# Patient Record
Sex: FEMALE | Race: WHITE | NOT HISPANIC OR LATINO | Employment: FULL TIME | URBAN - METROPOLITAN AREA
[De-identification: names, ages, dates, MRNs, and addresses within clinical notes are randomized per-mention and may not be internally consistent; named-entity substitution may affect disease eponyms.]

---

## 2023-11-03 PROBLEM — O99.019 ANEMIA OF PREGNANCY (HHS-HCC): Status: ACTIVE | Noted: 2023-11-03

## 2023-11-03 RX ORDER — IBUPROFEN 200 MG
600 TABLET ORAL EVERY 6 HOURS PRN
COMMUNITY
End: 2023-11-06

## 2023-11-03 RX ORDER — NAPROXEN 500 MG/1
500 TABLET ORAL
COMMUNITY
Start: 2015-04-14 | End: 2023-11-06

## 2023-11-06 ENCOUNTER — INITIAL PRENATAL (OUTPATIENT)
Dept: OBSTETRICS AND GYNECOLOGY | Facility: CLINIC | Age: 28
End: 2023-11-06
Payer: COMMERCIAL

## 2023-11-06 VITALS — WEIGHT: 123 LBS | SYSTOLIC BLOOD PRESSURE: 110 MMHG | BODY MASS INDEX: 23.24 KG/M2 | DIASTOLIC BLOOD PRESSURE: 60 MMHG

## 2023-11-06 DIAGNOSIS — Z32.01 POSITIVE URINE PREGNANCY TEST (HHS-HCC): Primary | ICD-10-CM

## 2023-11-06 LAB — PREGNANCY TEST URINE, POC: POSITIVE

## 2023-11-06 PROCEDURE — 87086 URINE CULTURE/COLONY COUNT: CPT

## 2023-11-06 PROCEDURE — 87800 DETECT AGNT MULT DNA DIREC: CPT

## 2023-11-06 PROCEDURE — 99215 OFFICE O/P EST HI 40 MIN: CPT | Performed by: OBSTETRICS & GYNECOLOGY

## 2023-11-06 PROCEDURE — 81025 URINE PREGNANCY TEST: CPT | Performed by: OBSTETRICS & GYNECOLOGY

## 2023-11-06 PROCEDURE — 88175 CYTOPATH C/V AUTO FLUID REDO: CPT

## 2023-11-06 PROCEDURE — 87186 SC STD MICRODIL/AGAR DIL: CPT

## 2023-11-06 ASSESSMENT — EDINBURGH POSTNATAL DEPRESSION SCALE (EPDS)
I HAVE BEEN SO UNHAPPY THAT I HAVE BEEN CRYING: NO, NEVER
I HAVE BEEN SO UNHAPPY THAT I HAVE HAD DIFFICULTY SLEEPING: NOT VERY OFTEN
I HAVE BLAMED MYSELF UNNECESSARILY WHEN THINGS WENT WRONG: NOT VERY OFTEN
I HAVE FELT SCARED OR PANICKY FOR NO GOOD REASON: NO, NOT MUCH
I HAVE LOOKED FORWARD WITH ENJOYMENT TO THINGS: AS MUCH AS I EVER DID
I HAVE BEEN ABLE TO LAUGH AND SEE THE FUNNY SIDE OF THINGS: AS MUCH AS I ALWAYS COULD
I HAVE BEEN ANXIOUS OR WORRIED FOR NO GOOD REASON: YES, SOMETIMES
TOTAL SCORE: 6
THE THOUGHT OF HARMING MYSELF HAS OCCURRED TO ME: NEVER
I HAVE FELT SAD OR MISERABLE: NO, NOT AT ALL
THINGS HAVE BEEN GETTING ON TOP OF ME: NO, MOST OF THE TIME I HAVE COPED QUITE WELL

## 2023-11-06 NOTE — PROGRESS NOTES
25-year-old -0-0-1 woman presents for new OB visit.  She smokes 1 black amount a day and uses marijuana.  She reports that the marijuana helps with her appetite.  She is without any symptoms of SAB or preeclampsia.      GynH: M12/Qmonths.  Denies any STIs, PID, abnormal Pap smears or sexual abuse.  She is sexually active with 1 male partner.    OBHx:  x1.     A/P: HROB - tobacco and MJ use     -  NOB packet given     -  PNV rx     -  PNL     -  1st  Check     -  Tobacco cessation encouraged     -  MJ d/w pt     -  Genetics for desired testing    -  D/W the patient our practice     -  RTC 4 weeks     -  Pap sent

## 2023-11-06 NOTE — PATIENT INSTRUCTIONS
Congratulations on your pregnancy!    Pap smear was sent.  Results should be available in the next few weeks.  However, if you have been unable to review the results by the end of the month please give the office a call.    Routine prenatal labs are sent today.  Results should be available by Friday.  You may call the office and select option #2 to speak with the nurse to obtain the results.    A prescription for a prenatal vitamin has been sent to your pharmacy.  Start taking 1 tablet daily.    We encourage you to stop smoking and using marijuana.  You the information about smoking and marijuana use during pregnancy.    Return to the office every 4 weeks for your next OB appointment.    Feel free to call the office with any problems, questions or concerns prior to your next scheduled visit.

## 2023-11-08 ENCOUNTER — TELEPHONE (OUTPATIENT)
Dept: OBSTETRICS AND GYNECOLOGY | Facility: CLINIC | Age: 28
End: 2023-11-08

## 2023-11-08 ENCOUNTER — LAB (OUTPATIENT)
Dept: LAB | Facility: LAB | Age: 28
End: 2023-11-08
Payer: COMMERCIAL

## 2023-11-08 DIAGNOSIS — Z32.01 POSITIVE URINE PREGNANCY TEST (HHS-HCC): ICD-10-CM

## 2023-11-08 DIAGNOSIS — O23.41 URINARY TRACT INFECTION IN MOTHER DURING FIRST TRIMESTER OF PREGNANCY (HHS-HCC): Primary | ICD-10-CM

## 2023-11-08 LAB
ABO GROUP (TYPE) IN BLOOD: NORMAL
ANTIBODY SCREEN: NORMAL
BACTERIA UR CULT: ABNORMAL
C TRACH RRNA SPEC QL NAA+PROBE: NEGATIVE
ERYTHROCYTE [DISTWIDTH] IN BLOOD BY AUTOMATED COUNT: 13.4 % (ref 11.5–14.5)
HBV SURFACE AG SERPL QL IA: NONREACTIVE
HCT VFR BLD AUTO: 35 % (ref 36–46)
HCV AB SER QL: NONREACTIVE
HGB BLD-MCNC: 11.4 G/DL (ref 12–16)
HIV 1+2 AB+HIV1 P24 AG SERPL QL IA: NONREACTIVE
MCH RBC QN AUTO: 28.2 PG (ref 26–34)
MCHC RBC AUTO-ENTMCNC: 32.6 G/DL (ref 32–36)
MCV RBC AUTO: 87 FL (ref 80–100)
N GONORRHOEA DNA SPEC QL PROBE+SIG AMP: NEGATIVE
NRBC BLD-RTO: 0 /100 WBCS (ref 0–0)
PLATELET # BLD AUTO: 268 X10*3/UL (ref 150–450)
RBC # BLD AUTO: 4.04 X10*6/UL (ref 4–5.2)
RH FACTOR (ANTIGEN D): NORMAL
RUBV IGG SERPL IA-ACNC: 0.3 IA
RUBV IGG SERPL QL IA: NEGATIVE
T PALLIDUM AB SER QL: NONREACTIVE
T4 FREE SERPL-MCNC: 0.9 NG/DL (ref 0.78–1.48)
TSH SERPL-ACNC: 0.36 MIU/L (ref 0.44–3.98)
WBC # BLD AUTO: 8.6 X10*3/UL (ref 4.4–11.3)

## 2023-11-08 PROCEDURE — 83021 HEMOGLOBIN CHROMOTOGRAPHY: CPT

## 2023-11-08 PROCEDURE — 84439 ASSAY OF FREE THYROXINE: CPT

## 2023-11-08 PROCEDURE — 86803 HEPATITIS C AB TEST: CPT

## 2023-11-08 PROCEDURE — 86317 IMMUNOASSAY INFECTIOUS AGENT: CPT

## 2023-11-08 PROCEDURE — 83020 HEMOGLOBIN ELECTROPHORESIS: CPT

## 2023-11-08 PROCEDURE — 87340 HEPATITIS B SURFACE AG IA: CPT

## 2023-11-08 PROCEDURE — 86900 BLOOD TYPING SEROLOGIC ABO: CPT

## 2023-11-08 PROCEDURE — 36415 COLL VENOUS BLD VENIPUNCTURE: CPT

## 2023-11-08 PROCEDURE — 87389 HIV-1 AG W/HIV-1&-2 AB AG IA: CPT

## 2023-11-08 PROCEDURE — 85027 COMPLETE CBC AUTOMATED: CPT

## 2023-11-08 PROCEDURE — 86901 BLOOD TYPING SEROLOGIC RH(D): CPT

## 2023-11-08 PROCEDURE — 86780 TREPONEMA PALLIDUM: CPT

## 2023-11-08 PROCEDURE — 84443 ASSAY THYROID STIM HORMONE: CPT

## 2023-11-08 PROCEDURE — 86850 RBC ANTIBODY SCREEN: CPT

## 2023-11-08 RX ORDER — CEPHALEXIN 500 MG/1
500 CAPSULE ORAL 4 TIMES DAILY
Qty: 28 CAPSULE | Refills: 0 | Status: SHIPPED | OUTPATIENT
Start: 2023-11-08 | End: 2023-11-15

## 2023-11-08 NOTE — TELEPHONE ENCOUNTER
Nurse left message for pt to return call.  Felisa Leonard MD  [facility] Clinical Support Staff  1st Trimester OB.  No known drug allergies.  Keflex 500 mg 4 times a day for 7 days

## 2023-11-09 LAB
HEMOGLOBIN A2: 3 % (ref 2–3.5)
HEMOGLOBIN A: 96.3 % (ref 95.8–98)
HEMOGLOBIN F: 0.7 % (ref 0–2)
HEMOGLOBIN IDENTIFICATION INTERPRETATION: NORMAL
PATH REVIEW-HGB IDENTIFICATION: NORMAL
REFLEX ADDED, ANEMIA PANEL: NORMAL

## 2023-11-09 NOTE — TELEPHONE ENCOUNTER
Pt verified by name and .  Pt is aware of Dr. Leonard's message:  Felisa Leonard MD  [facility] [place] [id number] [facility] Clinical Support Staff  1st Trimester OB.  No known drug allergies.  Keflex 500 mg 4 times a day for 7 days  Pt has no questions or concerns at this time.

## 2023-11-15 ENCOUNTER — ANCILLARY PROCEDURE (OUTPATIENT)
Dept: RADIOLOGY | Facility: CLINIC | Age: 28
End: 2023-11-15
Payer: COMMERCIAL

## 2023-11-15 DIAGNOSIS — Z34.90 PREGNANT (HHS-HCC): ICD-10-CM

## 2023-11-15 PROCEDURE — 76813 OB US NUCHAL MEAS 1 GEST: CPT | Performed by: OBSTETRICS & GYNECOLOGY

## 2023-11-15 PROCEDURE — 76801 OB US < 14 WKS SINGLE FETUS: CPT

## 2023-11-15 PROCEDURE — 76813 OB US NUCHAL MEAS 1 GEST: CPT

## 2023-11-21 ENCOUNTER — CLINICAL SUPPORT (OUTPATIENT)
Dept: GENETICS | Facility: HOSPITAL | Age: 28
End: 2023-11-21
Payer: COMMERCIAL

## 2023-11-21 ENCOUNTER — LAB (OUTPATIENT)
Dept: LAB | Facility: LAB | Age: 28
End: 2023-11-21
Payer: COMMERCIAL

## 2023-11-21 VITALS — BODY MASS INDEX: 23.81 KG/M2 | DIASTOLIC BLOOD PRESSURE: 60 MMHG | WEIGHT: 126 LBS | SYSTOLIC BLOOD PRESSURE: 98 MMHG

## 2023-11-21 DIAGNOSIS — Z13.79 ENCOUNTER FOR GENETIC SCREENING: ICD-10-CM

## 2023-11-21 DIAGNOSIS — Z32.01 POSITIVE URINE PREGNANCY TEST (HHS-HCC): ICD-10-CM

## 2023-11-21 PROCEDURE — GENMD PR GENETICS VISIT (MEDICAID/MEDICARE): Performed by: GENETIC COUNSELOR, MS

## 2023-11-21 PROCEDURE — 36415 COLL VENOUS BLD VENIPUNCTURE: CPT

## 2023-11-21 ASSESSMENT — PAIN SCALES - GENERAL: PAINLEVEL: 0-NO PAIN

## 2023-11-22 LAB
CYTOLOGY CMNT CVX/VAG CYTO-IMP: NORMAL
LAB AP HPV GENOTYPE QUESTION: YES
LAB AP HPV HR: NORMAL
LAB AP PAP ADDITIONAL TESTS: NORMAL
LABORATORY COMMENT REPORT: NORMAL
LMP START DATE: NORMAL
MENSTRUAL HX REPORTED: NORMAL
PATH REPORT.TOTAL CANCER: NORMAL

## 2023-11-29 NOTE — PROGRESS NOTES
"Cee Morales is a 27 y.o. old, , female who was approximately 12 weeks and 5 days pregnant at the time of our appointment with an EDC of 24.  She was seen to discuss her genetic screening and testing options.    PAST HISTORY:  Ms. Morales reported that she is currently smoking one black and mild a day.  She was informed that smoking increases the risk to have a baby with low birth weight, placental disorders, and  delivery.  Quitting smoking at anytime during the pregnancy has been shown to be beneficial for the fetus.      Ultrasound 11/15/23, per report:  \"- In utero gestational sac with a live fetus  - Crown rump length is consistent with given MARLON  - Fetal organ survey is within normal limits for the gestational age  - Nuchal translucency is within normal limits measuring 1.4 mm  - Normal appearing adnexa...\"    The patient has not had any screening or diagnostic testing for aneuploidy in her pregnancy thus far.    FAMILY HISTORY:  Medical and family histories were reviewed and the following concerns regarding this pregnancy were apparent:      Ms. Alaniz:  Brother- \"hole in heart\"  Paternal grandmother- breast cancer, diagnosed in her early 60's    The remainder of the family history was negative for birth defects, intellectual disability, recurrent pregnancy loss, or recognized inherited conditions.  Consanguinity was denied.  The Pedigree is scanned in the Media tab and is available for a full review of the family history.      ETHNICITY:  The patient reported that she is of  (countries of origin are unknown) ethnicity.  She reported that her partner is of  ethnicity.  Ashkenazi Muslim Ancestry was denied.    We discussed the availability, benefits, and limitations of carrier screening for cystic fibrosis (CF), spinal muscular atrophy (SMA), and hemoglobinopathies/thalassemias. We reviewed the carrier frequencies of these conditions, varied clinical " manifestations, and their autosomal recessive inheritance. The patient has already had negative carrier screening for hemoglobinopathies and thalassemias via CBC and hemoglobin electrophoresis and these results were reviewed. If both members of the couple are found to be carriers for the same autosomal recessive condition, there is a 25% chance for an affected child and prenatal diagnosis would be available. Negative carrier screening does not rule out the possibility of being a carrier.  screening is available for CF, hemoglobinopathies, and thalassemias, and SMA.  We also discussed the availability of more expanded/pan ethnic carrier screening for additional, primarily autosomal recessive, conditions. We discussed the pros and cons of expanded carrier screening including the higher likelihood of being identified as a carrier for at least one condition on a larger panel. Approximately 4% of couples are found to be at risk to have a child with a genetic disorder based on this screening. In rare cases, expanded carrier screening results may have health implications for the tested individual.      After careful consideration, the patient declined all carrier screening.      COUNSELING:  The following information was discussed with your patient:    1. The general population risk of 3-5% for birth defects in every pregnancy.    2. Chromosomes, genes, non-disjunction, and common aneuploidies.    3.  Based on the age of 28 at EDC alone, at this gestation, the risk for the fetus to have Down syndrome is approximately 1 in 770.  The combined risk for the fetus to have Trisomy 21/18/13 is approximately 1 in 515.  This does not include copy number variation, mosaicism, single gene disorders, translocations, and marker chromosomes.     4. The availability of maternal serum screening during the first trimester.    5. The availability, benefits and limitations of ultrasound study. An ultrasound study is recommended at  12-14 weeks gestation for assessment of nuchal translucency and again at 19-20 weeks gestation to survey fetal organs. An ultrasound study in the second trimester can identify 50% of Down syndrome cases and 80-90% of trisomy 18 or trisomy 13 cases.     6. The availability, benefits and limitations of standard cell-free DNA screening.  We discussed the methodology for this screen, which includes using cell-free DNA obtained from a mother's blood (derived from the placenta) to screen for the presence of common chromosomal abnormalities. Depending on the laboratory used, there is a >99% sensitivity for Down syndrome, at least 97.4% sensitivity for trisomy 18, and at least 91% sensitivity for trisomy 13.  Specificity for these trisomies is >99%. Although sensitivity and specificity rates are high, in our experience the positive predictive value is dependent on many factors; whereas false negative results are rare.  In addition, anticoagulants, maternal chromosome abnormality, fibroids or malignancy may impact results and/or be associated with inconclusive or other abnormal findings.  This is not a diagnostic test.  Therefore, in the event of an abnormal result, prenatal diagnosis through amniocentesis is recommended to confirm the findings, if confirmation is desired.  Results take approximately 7-10 days.      7. The methods, benefits, limitations and risks of CVS.  There is an approximate 1 in 200 risk of complications including a 1 in 400 risk for miscarriage. The approximate 1% risk of confined placental mosaicism in CVS was discussed.     8. The methods, benefits, limitations and risks of amniocentesis.  There is an approximate 1 in 400 risk of complications including a 1 in 800 risk of miscarriage.    9. Additional Family History Information:  The patient's brother was born with a congenital heart defect. A congenital heart defect may be an isolated finding or one feature of a chromosomal condition, single gene  disorder, or multiple malformation syndrome. When isolated (occurring alone), congenital heart defects are often considered multifactorial in origin, involving a combination of genetic, environmental and unknown factors. If the defect is isolated, the risk for close relatives is increased.  If isolated, the risk for the couple's offspring to have a congenital heart defect is approximately 1-2% compared to the general population risk of 0.5-1%.  If the defect is due to a chromosomal condition, single gene disorder, or multiple malformation syndrome, the risk to relatives may be significantly higher.     DISPOSITION:  The patient stated that she understood the above information and elected to proceed with standard cell-free DNA screening to [employer] for [employer].  All carrier screening was declined. Diagnostic testing in the form of CVS and genetic amniocentesis was declined.      We recommend an anatomy ultrasound at 19 weeks.    Thank you for allowing us to participate in the care of your patient.  Should you or your patient have any questions, please do not hesitate to contact our office at [phone].    Licensed Genetic Counselor Malathi Beck MS, [facility] spent 25 minutes with the patient with greater than 50% of the time spent in face to face counseling.     Sincerely,     Malathi Beck MS  Licensed Genetic Counselor    Reviewed by:  Dr. Chavez Martinez MD  Maternal Fetal Medicine Specialist

## 2023-12-04 ENCOUNTER — ROUTINE PRENATAL (OUTPATIENT)
Dept: OBSTETRICS AND GYNECOLOGY | Facility: CLINIC | Age: 28
End: 2023-12-04
Payer: COMMERCIAL

## 2023-12-04 VITALS — SYSTOLIC BLOOD PRESSURE: 108 MMHG | BODY MASS INDEX: 24 KG/M2 | DIASTOLIC BLOOD PRESSURE: 60 MMHG | WEIGHT: 127 LBS

## 2023-12-04 DIAGNOSIS — O09.91 HIGH-RISK PREGNANCY IN FIRST TRIMESTER (HHS-HCC): Primary | ICD-10-CM

## 2023-12-04 PROCEDURE — 99213 OFFICE O/P EST LOW 20 MIN: CPT | Performed by: OBSTETRICS & GYNECOLOGY

## 2023-12-04 NOTE — PROGRESS NOTES
She reports feeling well.  She is without any symptoms of SAB or PEC.  She did genetic testing and was excited to know the gender.  She has decreased her smoking.  She is without nausea/vomiting.    A/P: 13+ week IUP - tobacco and MJ use has decreased     -  RTC 4 weeks     -  D/W the patient when to call

## 2023-12-18 ENCOUNTER — TELEPHONE (OUTPATIENT)
Dept: OBSTETRICS AND GYNECOLOGY | Facility: CLINIC | Age: 28
End: 2023-12-18
Payer: COMMERCIAL

## 2023-12-18 DIAGNOSIS — Z36.89 SCREENING, ANTENATAL, FOR FETAL ANATOMIC SURVEY (HHS-HCC): ICD-10-CM

## 2023-12-27 LAB — SCAN RESULT: NORMAL

## 2024-01-03 ENCOUNTER — HOSPITAL ENCOUNTER (OUTPATIENT)
Dept: RADIOLOGY | Facility: HOSPITAL | Age: 29
Discharge: HOME | End: 2024-01-03
Payer: COMMERCIAL

## 2024-01-03 DIAGNOSIS — Z36.89 SCREENING, ANTENATAL, FOR FETAL ANATOMIC SURVEY (HHS-HCC): ICD-10-CM

## 2024-01-03 PROCEDURE — 76805 OB US >/= 14 WKS SNGL FETUS: CPT | Performed by: OBSTETRICS & GYNECOLOGY

## 2024-01-03 PROCEDURE — 76811 OB US DETAILED SNGL FETUS: CPT

## 2024-01-09 ENCOUNTER — ROUTINE PRENATAL (OUTPATIENT)
Dept: OBSTETRICS AND GYNECOLOGY | Facility: CLINIC | Age: 29
End: 2024-01-09
Payer: COMMERCIAL

## 2024-01-09 VITALS — SYSTOLIC BLOOD PRESSURE: 110 MMHG | DIASTOLIC BLOOD PRESSURE: 60 MMHG | WEIGHT: 135 LBS | BODY MASS INDEX: 25.51 KG/M2

## 2024-01-09 DIAGNOSIS — O09.91 HIGH-RISK PREGNANCY IN FIRST TRIMESTER (HHS-HCC): Primary | ICD-10-CM

## 2024-01-09 DIAGNOSIS — O99.330 TOBACCO USE AFFECTING PREGNANCY, ANTEPARTUM (HHS-HCC): ICD-10-CM

## 2024-01-09 PROCEDURE — 99214 OFFICE O/P EST MOD 30 MIN: CPT | Performed by: OBSTETRICS & GYNECOLOGY

## 2024-01-09 NOTE — PROGRESS NOTES
The patient reports good fetal movement.  She is without symptoms of PTL or PEC, although she did have a headache that lasted all day yesterday and finally went away.  She was unsure what she could use in pregnancy.    O: normal anatomy US      A/P: RAFAEL     -  RTC 4 weeks     -  Tobacco cessation encouraged

## 2024-02-06 ENCOUNTER — ROUTINE PRENATAL (OUTPATIENT)
Dept: OBSTETRICS AND GYNECOLOGY | Facility: CLINIC | Age: 29
End: 2024-02-06
Payer: COMMERCIAL

## 2024-02-06 VITALS — DIASTOLIC BLOOD PRESSURE: 50 MMHG | SYSTOLIC BLOOD PRESSURE: 98 MMHG | WEIGHT: 145 LBS | BODY MASS INDEX: 27.4 KG/M2

## 2024-02-06 DIAGNOSIS — Z34.82 PRENATAL CARE, SUBSEQUENT PREGNANCY IN SECOND TRIMESTER (HHS-HCC): Primary | ICD-10-CM

## 2024-02-06 DIAGNOSIS — O99.330 TOBACCO USE AFFECTING PREGNANCY, ANTEPARTUM (HHS-HCC): ICD-10-CM

## 2024-02-06 PROCEDURE — 99214 OFFICE O/P EST MOD 30 MIN: CPT | Performed by: OBSTETRICS & GYNECOLOGY

## 2024-02-06 NOTE — PROGRESS NOTES
Patient reports lots of fetal movement.  She is without symptoms of PTL or PEC.    She is overall feeling well.  She is still smoking.    A/P: RAFAEL - smoking    -  RTC 4 weeks     -  Second trimester labs and depression screen at next visit

## 2024-03-05 ENCOUNTER — ROUTINE PRENATAL (OUTPATIENT)
Dept: OBSTETRICS AND GYNECOLOGY | Facility: CLINIC | Age: 29
End: 2024-03-05
Payer: COMMERCIAL

## 2024-03-05 VITALS — DIASTOLIC BLOOD PRESSURE: 60 MMHG | BODY MASS INDEX: 28.34 KG/M2 | SYSTOLIC BLOOD PRESSURE: 112 MMHG | WEIGHT: 150 LBS

## 2024-03-05 DIAGNOSIS — Z34.82 PRENATAL CARE, SUBSEQUENT PREGNANCY IN SECOND TRIMESTER (HHS-HCC): ICD-10-CM

## 2024-03-05 DIAGNOSIS — O99.330 TOBACCO USE AFFECTING PREGNANCY, ANTEPARTUM (HHS-HCC): Primary | ICD-10-CM

## 2024-03-05 PROCEDURE — 82746 ASSAY OF FOLIC ACID SERUM: CPT

## 2024-03-05 PROCEDURE — 83550 IRON BINDING TEST: CPT

## 2024-03-05 PROCEDURE — 85027 COMPLETE CBC AUTOMATED: CPT

## 2024-03-05 PROCEDURE — 99214 OFFICE O/P EST MOD 30 MIN: CPT | Performed by: OBSTETRICS & GYNECOLOGY

## 2024-03-05 PROCEDURE — 82728 ASSAY OF FERRITIN: CPT

## 2024-03-05 PROCEDURE — 82947 ASSAY GLUCOSE BLOOD QUANT: CPT

## 2024-03-05 PROCEDURE — 36415 COLL VENOUS BLD VENIPUNCTURE: CPT

## 2024-03-05 PROCEDURE — 86780 TREPONEMA PALLIDUM: CPT

## 2024-03-05 PROCEDURE — 82607 VITAMIN B-12: CPT

## 2024-03-05 ASSESSMENT — EDINBURGH POSTNATAL DEPRESSION SCALE (EPDS)
I HAVE BLAMED MYSELF UNNECESSARILY WHEN THINGS WENT WRONG: NO, NEVER
I HAVE FELT SCARED OR PANICKY FOR NO GOOD REASON: NO, NOT AT ALL
I HAVE LOOKED FORWARD WITH ENJOYMENT TO THINGS: AS MUCH AS I EVER DID
I HAVE BEEN SO UNHAPPY THAT I HAVE HAD DIFFICULTY SLEEPING: NOT AT ALL
I HAVE BEEN SO UNHAPPY THAT I HAVE BEEN CRYING: NO, NEVER
THINGS HAVE BEEN GETTING ON TOP OF ME: NO, I HAVE BEEN COPING AS WELL AS EVER
I HAVE BEEN ANXIOUS OR WORRIED FOR NO GOOD REASON: NO, NOT AT ALL
THE THOUGHT OF HARMING MYSELF HAS OCCURRED TO ME: NEVER
TOTAL SCORE: 0
I HAVE FELT SAD OR MISERABLE: NO, NOT AT ALL
I HAVE BEEN ABLE TO LAUGH AND SEE THE FUNNY SIDE OF THINGS: AS MUCH AS I ALWAYS COULD

## 2024-03-05 NOTE — PROGRESS NOTES
Patient reports good fetal movement.  She is without symptoms of PTL or PEC.  She is starting to get ready for delivery.  She is unsure what she would like to use for contraception.  She has a pediatrician selected. She does not plan to breastfeed.     O: S+D     A/P: RAFAEL     -  Second trimester labs today     -  Pt to continues to decrease tobacco     -  Fetal movement     -  Tour info for [facility] at next visit

## 2024-03-06 LAB
ERYTHROCYTE [DISTWIDTH] IN BLOOD BY AUTOMATED COUNT: 13.3 % (ref 11.5–14.5)
FERRITIN SERPL-MCNC: 38 NG/ML
GLUCOSE 1H P 50 G GLC PO SERPL-MCNC: 83 MG/DL
HCT VFR BLD AUTO: 32.1 % (ref 36–46)
HGB BLD-MCNC: 10 G/DL (ref 12–16)
IRON SATN MFR SERPL: 17 %
IRON SERPL-MCNC: 79 UG/DL
MCH RBC QN AUTO: 28.2 PG (ref 26–34)
MCHC RBC AUTO-ENTMCNC: 31.2 G/DL (ref 32–36)
MCV RBC AUTO: 91 FL (ref 80–100)
NRBC BLD-RTO: 0 /100 WBCS (ref 0–0)
PLATELET # BLD AUTO: 247 X10*3/UL (ref 150–450)
RBC # BLD AUTO: 3.54 X10*6/UL (ref 4–5.2)
REFLEX ADDED, ANEMIA PANEL: NORMAL
TIBC SERPL-MCNC: 462 UG/DL
TREPONEMA PALLIDUM IGG+IGM AB [PRESENCE] IN SERUM OR PLASMA BY IMMUNOASSAY: NONREACTIVE
UIBC SERPL-MCNC: 383 UG/DL
VIT B12 SERPL-MCNC: 191 PG/ML
WBC # BLD AUTO: 10.1 X10*3/UL (ref 4.4–11.3)

## 2024-03-07 LAB — FOLATE SERPL-MCNC: 18.3 NG/ML

## 2024-03-21 ENCOUNTER — ROUTINE PRENATAL (OUTPATIENT)
Dept: OBSTETRICS AND GYNECOLOGY | Facility: CLINIC | Age: 29
End: 2024-03-21
Payer: COMMERCIAL

## 2024-03-21 VITALS — WEIGHT: 152 LBS | SYSTOLIC BLOOD PRESSURE: 92 MMHG | BODY MASS INDEX: 28.72 KG/M2 | DIASTOLIC BLOOD PRESSURE: 64 MMHG

## 2024-03-21 DIAGNOSIS — O99.013 ANEMIA DURING PREGNANCY IN THIRD TRIMESTER (HHS-HCC): Primary | ICD-10-CM

## 2024-03-21 PROCEDURE — 99214 OFFICE O/P EST MOD 30 MIN: CPT | Performed by: ADVANCED PRACTICE MIDWIFE

## 2024-03-21 RX ORDER — FAMOTIDINE 10 MG/ML
20 INJECTION INTRAVENOUS ONCE AS NEEDED
Status: CANCELLED | OUTPATIENT
Start: 2024-03-21

## 2024-03-21 RX ORDER — ALBUTEROL SULFATE 0.83 MG/ML
3 SOLUTION RESPIRATORY (INHALATION) AS NEEDED
Status: CANCELLED | OUTPATIENT
Start: 2024-03-21

## 2024-03-21 RX ORDER — DIPHENHYDRAMINE HYDROCHLORIDE 50 MG/ML
50 INJECTION INTRAMUSCULAR; INTRAVENOUS AS NEEDED
Status: CANCELLED | OUTPATIENT
Start: 2024-03-21

## 2024-03-21 RX ORDER — EPINEPHRINE 0.3 MG/.3ML
0.3 INJECTION SUBCUTANEOUS EVERY 5 MIN PRN
Status: CANCELLED | OUTPATIENT
Start: 2024-03-21

## 2024-03-21 NOTE — PROGRESS NOTES
"Assessment/Plan   Problem List Items Addressed This Visit             ICD-10-CM    Anemia of pregnancy - Primary O99.019     Anemia with Ferrous Sulfate 325mg starting in 1st trimester  HGB 10.0 @ 28 weeks  For IV iron            Persistent anemia despite Ferrous Sulfate since 1st trimester.  Last HGB 10.0 at 28 weeks  Reviewed s/sx of PTL, warning signs, fetal movement counts, and when to call provider  Follow up in 2 week for a routine prenatal visit.    Rosana Hamm, ENEDINA-ANETA Vargas     Cee Morales is a 28 y.o.  at 30w0d with a working estimated date of delivery of 2024, by Ultrasound who presents for a routine prenatal visit.  Declines TDAP    Vaginal bleeding no  Leakage of fluid no  Decreased fetal movements no  Contractions no    Her pregnancy is complicated by:  Pregnancy Problems (from 23 to present)       No problems associated with this episode.             Objective   Physical Exam:   Weight: 68.9 kg (152 lb)  TWG: Not found.  Expected Total Weight Gain: Could not be calculated   Pregravid BMI: Could not be calculated  BP: 92/64                  Postpartum Depression: Low Risk  (3/5/2024)    [place]  Depression Scale     Last EPDS Total Score: 0     Last EPDS Self Harm Result: Never        Prenatal Labs  Lab Results   Component Value Date    HGB 10.0 (L) 2024    HCT 32.1 (L) 2024     2024    ABO O 2023    LABRH POS 2023    NEISSGONOAMP Negative 2023    CHLAMTRACAMP Negative 2023    SYPHT Nonreactive 2024    HEPBSAG Nonreactive 2023    HIV1X2 Nonreactive 2023    URINECULTURE >100,000 Escherichia coli (A) 2023     Lab Results   Component Value Date    GLUC1P 83 2024     No results found for: \"GRPBSTREP\"     Education provided  "

## 2024-04-02 ENCOUNTER — DOCUMENTATION (OUTPATIENT)
Dept: INFUSION THERAPY | Facility: CLINIC | Age: 29
End: 2024-04-02

## 2024-04-02 ENCOUNTER — ROUTINE PRENATAL (OUTPATIENT)
Dept: OBSTETRICS AND GYNECOLOGY | Facility: CLINIC | Age: 29
End: 2024-04-02
Payer: COMMERCIAL

## 2024-04-02 VITALS — WEIGHT: 155 LBS | BODY MASS INDEX: 29.29 KG/M2 | DIASTOLIC BLOOD PRESSURE: 62 MMHG | SYSTOLIC BLOOD PRESSURE: 110 MMHG

## 2024-04-02 DIAGNOSIS — Z59.41 FOOD INSECURITY: ICD-10-CM

## 2024-04-02 DIAGNOSIS — O09.93 HIGH-RISK PREGNANCY IN THIRD TRIMESTER (HHS-HCC): Primary | ICD-10-CM

## 2024-04-02 DIAGNOSIS — O99.013 ANEMIA DURING PREGNANCY IN THIRD TRIMESTER (HHS-HCC): ICD-10-CM

## 2024-04-02 PROCEDURE — 99214 OFFICE O/P EST MOD 30 MIN: CPT | Performed by: OBSTETRICS & GYNECOLOGY

## 2024-04-02 SDOH — ECONOMIC STABILITY - FOOD INSECURITY: FOOD INSECURITY: Z59.41

## 2024-04-02 SDOH — ECONOMIC STABILITY: FOOD INSECURITY
WITHIN THE PAST 12 MONTHS, THE FOOD YOU BOUGHT JUST DIDN'T LAST AND YOU DIDN'T HAVE MONEY TO GET MORE.: PATIENT UNABLE TO ANSWER

## 2024-04-02 SDOH — ECONOMIC STABILITY: FOOD INSECURITY: WITHIN THE PAST 12 MONTHS, YOU WORRIED THAT YOUR FOOD WOULD RUN OUT BEFORE YOU GOT MONEY TO BUY MORE.: SOMETIMES TRUE

## 2024-04-02 NOTE — PROGRESS NOTES
Patient to be scheduled for acute venofer infusions.   For Diagnosis: Iron Deficiency Anemia in thrid trimester of pregnancy  Venofer 300 mg times 3 doses.  Labs…  Iron studies 3/5/24  % saturation 17  Iron 79  Ferritin 38  TIBC 462  Lab Results   Component Value Date    WBC 10.1 03/05/2024    HGB 10.0 (L) 03/05/2024    HCT 32.1 (L) 03/05/2024    MCV 91 03/05/2024     03/05/2024        Urine Hcg test ordered prior to first infusion?  Not applicable (If female pt <60 years of age and with reproductive capability)  (If urine Hcg test ordered please instruct pt upon scheduling to drink 32 ounces of water 1 hour before arrival so bladder is full for needed urine sample)    Last infusion received: new (if continuation)    Due: ASAP    This result meets treatment criteria.

## 2024-04-02 NOTE — PROGRESS NOTES
She reports good FM and is w/o sx of PTL or PEC. She is starting to get ready for delivery and would aaron 8 week of FMLA. She occasionally receives food assistance and rent assistance from her mother/food insecurity.     Iron infusions are scheduled.     It will be difficulty for her to work and pay for  for 2 children.     A/P: RAFAEL     -  Fetal movement     -  RTC 2 weeks     -  Food for life referral

## 2024-04-08 ENCOUNTER — APPOINTMENT (OUTPATIENT)
Dept: INFUSION THERAPY | Facility: CLINIC | Age: 29
End: 2024-04-08
Payer: COMMERCIAL

## 2024-04-16 ENCOUNTER — APPOINTMENT (OUTPATIENT)
Dept: OBSTETRICS AND GYNECOLOGY | Facility: CLINIC | Age: 29
End: 2024-04-16
Payer: COMMERCIAL

## 2024-04-16 ENCOUNTER — INFUSION (OUTPATIENT)
Dept: INFUSION THERAPY | Facility: CLINIC | Age: 29
End: 2024-04-16
Payer: COMMERCIAL

## 2024-04-16 VITALS
SYSTOLIC BLOOD PRESSURE: 111 MMHG | TEMPERATURE: 98.5 F | WEIGHT: 158 LBS | DIASTOLIC BLOOD PRESSURE: 97 MMHG | HEART RATE: 91 BPM | RESPIRATION RATE: 16 BRPM | OXYGEN SATURATION: 99 % | BODY MASS INDEX: 29.85 KG/M2

## 2024-04-16 DIAGNOSIS — O99.013 ANEMIA DURING PREGNANCY IN THIRD TRIMESTER (HHS-HCC): ICD-10-CM

## 2024-04-16 PROCEDURE — 96365 THER/PROPH/DIAG IV INF INIT: CPT | Performed by: NURSE PRACTITIONER

## 2024-04-16 RX ORDER — EPINEPHRINE 0.3 MG/.3ML
0.3 INJECTION SUBCUTANEOUS EVERY 5 MIN PRN
Status: CANCELLED | OUTPATIENT
Start: 2024-04-20

## 2024-04-16 RX ORDER — FAMOTIDINE 10 MG/ML
20 INJECTION INTRAVENOUS ONCE AS NEEDED
Status: CANCELLED | OUTPATIENT
Start: 2024-04-20

## 2024-04-16 RX ORDER — DIPHENHYDRAMINE HYDROCHLORIDE 50 MG/ML
50 INJECTION INTRAMUSCULAR; INTRAVENOUS AS NEEDED
Status: CANCELLED | OUTPATIENT
Start: 2024-04-20

## 2024-04-16 RX ORDER — ALBUTEROL SULFATE 0.83 MG/ML
3 SOLUTION RESPIRATORY (INHALATION) AS NEEDED
Status: CANCELLED | OUTPATIENT
Start: 2024-04-20

## 2024-04-16 ASSESSMENT — ENCOUNTER SYMPTOMS: FATIGUE: 1

## 2024-04-16 ASSESSMENT — PAIN SCALES - GENERAL: PAINLEVEL: 0-NO PAIN

## 2024-04-16 NOTE — PATIENT INSTRUCTIONS
Today :We administered iron sucrose (Venofer) 300 mg in sodium chloride 0.9% 250 mL IV.     For:   1. Anemia during pregnancy in third trimester ([facility])         Your next appointment is due in:  04/15/24        Please read the  Medication Guide that was given to you and reviewed during todays visit.     (Tell all doctors including dentists that you are taking this medication)     Go to the emergency room or call 911 if:  -You have signs of allergic reaction:   -Rash, hives, itching.   -Swollen, blistered, peeling skin.   -Swelling of face, lips, mouth, tongue or throat.   -Tightness of chest, trouble breathing, swallowing or talking     Call your doctor:  - If IV / injection site gets red, warm, swollen, itchy or leaks fluid or pus.     (Leave dressing on your IV site for at least 2 hours and keep area clean and dry  - If you get sick or have symptoms of infection or are not feeling well for any reason.    (Wash your hands often, stay away from people who are sick)  - If you have side effects from your medication that do not go away or are bothersome.     (Refer to the teaching your nurse gave you for side effects to call your doctor about)    - Common side effects may include:  stuffy nose, headache, feeling tired, muscle aches, upset stomach  - Before receiving any vaccines     - Call the [facility] at   If:  - You get sick, are on antibiotics, have had a recent vaccine, have surgery or dental work and your doctor wants your visit rescheduled.  - You need to cancel and reschedule your visit for any reason. Call at least 2 days before your visit if you need to cancel.   - Your insurance changes before your next visit.    (We will need to get approval from your new insurance. This can take up to two weeks.)     The [facility] is opened Monday thru Friday. We are closed on weekends and holidays.   Voice mail will take your call if the center is closed. If you leave a message please  allow 24 hours for a call back during weekdays. If you leave a message on a weekend/holiday, we will call you back the next business day.

## 2024-04-16 NOTE — PROGRESS NOTES
[id number]   Infusion [facility] Note   Date: 2024   Name: Cee Morales  : 1995   MRN: [id number]         Reason for Visit: OP Infusion (Venofer)   Today is dose 1/3      Visit Type: INFUSION       Ordered By: Melissa Hamm NP      Accompanied by:Self      Diagnosis: Anemia during pregnancy in third trimester ([facility])       Allergies:   Allergies as of 2024    (No Known Allergies)         Current Medications has a current medication list which includes the following prescription(s): prenatal vit no.130-iron-folic.       Vitals:   Vitals:    24 1250 24 1455   BP: (!) 105/45 (!) 111/97   Pulse: 90 91   Resp: 16 16   Temp: 36.8 °C (98.3 °F) 36.9 °C (98.5 °F)   TempSrc: Temporal    SpO2: 99% 99%   Weight: 71.7 kg (158 lb)    PainSc: 0-No pain    LMP: 2023             Infusion Pre-procedure Checklist:   - Allergies reviewed: yes   - Medications reviewed: yes       - Previous reaction to current treatment: n/a      Assess patient for the concerns below. Document provider notification as appropriate.  - Active or recent infection with/without current antibiotic use: n/a  - Recent or planned invasive dental work: n/a  - Recent or planned surgeries: n/a  - Recently received or plans to receive vaccinations: n/a  - Has treatment related toxicities: n/a  - Is pregnant:  yes      Pain: 0   - Is the pain different from normal: n/a   - Is the pain tolerable: n/a   - Is your Doctor aware:  n/a      Labs: N/A         Fall Risk Screening: Arevalo Fall Risk  History of Falling, Immediate or Within 3 Months: No  Secondary Diagnosis: Yes  Ambulatory Aid: Walks without aid/bedrest/nurse assist  Intravenous Therapy/Heparin Lock: Yes  Gait/Transferring: Normal/bedrest/immobile  Mental Status: Oriented to own ability  Are[place]o Fall Risk Score: 35       Review Of Systems:  Review of Systems   Constitutional:  Positive for fatigue.   All other systems reviewed and  are negative.        Infusion Readiness:   - Assessment Concerns Related to Infusion: No  - Provider notified: no      Document Below Only If Indicated:   New Patient Education:    NEW PATIENT MEDICATION EDUCATION PT PROVIDED WITH WRITTEN ([employer] PT EDUCATION SHEET) AND VERBAL EDUCATION REGARDING MEDICATION GIVEN. VERIFIED MEDICATION NAME WITH PATIENT AND DISCUSSED REASON FOR USE. BRIEFLY DISCUSSED HOW MEDICATION WORKS AND EDUCATED ON GOAL OF TREATMENT, FREQUENCY OF TREATMENT, ADVERSE RXN'S AND COMMON SIDE EFFECTS TO MONITOR FOR. INSTRUCTED PT TO ASSURE THAT ALL PROVIDERS INCLUDING DENTISTS ARE AWARE OF MEDICATION RECEIVED. DISCUSSED FLOW OF VISIT AND ORIENTED TO INFUSION CENTER. PT VERBALIZES UNDERSTANDING. CALL LIGHT PROVIDED AND PT AWARE TO ALERT STAFF OF ANY CONCERNS DURING TREATMENT.        Treatment Conditions & Drug Specific Questions:            Weight Based Drug Calculations:    WEIGHT BASED DRUGS: NOT APPLICABLE / FLAT DOSE          Initiated By: Linda Hancock RN   Time: 3:22 PM     We administered iron sucrose (Venofer) 300 mg in sodium chloride 0.9% 250 mL IV.

## 2024-04-18 ENCOUNTER — ROUTINE PRENATAL (OUTPATIENT)
Dept: OBSTETRICS AND GYNECOLOGY | Facility: CLINIC | Age: 29
End: 2024-04-18
Payer: COMMERCIAL

## 2024-04-18 VITALS — WEIGHT: 161.8 LBS | DIASTOLIC BLOOD PRESSURE: 58 MMHG | BODY MASS INDEX: 30.57 KG/M2 | SYSTOLIC BLOOD PRESSURE: 104 MMHG

## 2024-04-18 DIAGNOSIS — Z3A.34 34 WEEKS GESTATION OF PREGNANCY (HHS-HCC): ICD-10-CM

## 2024-04-18 DIAGNOSIS — O47.9 BRAXTON HICK'S CONTRACTION (HHS-HCC): ICD-10-CM

## 2024-04-18 DIAGNOSIS — O99.013 ANEMIA DURING PREGNANCY IN THIRD TRIMESTER (HHS-HCC): Primary | ICD-10-CM

## 2024-04-18 DIAGNOSIS — O09.623 SUPERVISION OF SUBSEQUENT PREGNANCY, LESS THAN 16 YEARS OLD IN THIRD TRIMESTER (HHS-HCC): ICD-10-CM

## 2024-04-18 PROCEDURE — 99214 OFFICE O/P EST MOD 30 MIN: CPT | Performed by: ADVANCED PRACTICE MIDWIFE

## 2024-04-18 NOTE — PROGRESS NOTES
Assessment/Plan   Problem List Items Addressed This Visit             ICD-10-CM    Anemia of pregnancy ([facility]) - Primary O99.019    Relevant Orders    CBC Anemia Panel With Reflex,Pregnancy     Other Visit Diagnoses         Codes    Supervision of subsequent pregnancy, less than 16 years old in third trimester ([facility])     O09.623    34 weeks gestation of pregnancy ([facility])     Z3A.34    Karnes Hick's contraction ([facility])     O47.9            Continue with IV iron  GBS in 2 weeks  Reviewed s/sx of PTL, warning signs, fetal movement counts, and when to call provider  Follow up in 2 week for a routine prenatal visit.    ENEDINA Barreto-ANETA    Subjective     Cee Morales is a 28 y.o.  at 34w0d with a working estimated date of delivery of 2024, by Ultrasound who presents for a routine prenatal visit.     See problem list for IV iron update.  An order was placed to get a repeat blood count done 1 week after she has completed her IV iron.    Cee voiced she would like a 39 week induction.  I discussed with her that we can schedule that for her at 37 weeks.  GBS to be obtained in 2 weeks.        Vaginal bleeding no  Leakage of fluid no  Decreased fetal movements no  Contractions BH present since yesterday - cervical check done.    Her pregnancy is complicated by:  Pregnancy Problems (from 23 to present)       No problems associated with this episode.             Objective   Physical Exam:      TWG: Not found.  Expected Total Weight Gain: Could not be calculated   Pregravid BMI: Could not be calculated                     Postpartum Depression: Low Risk  (3/5/2024)    [place]  Depression Scale     Last EPDS Total Score: 0     Last EPDS Self Harm Result: Never        Prenatal Labs  Lab Results   Component Value Date    HGB 10.0 (L) 2024    HCT 32.1 (L) 2024     2024    ABO O 2023    LABRH POS 2023    NEISSGONOAMP Negative 2023     "CHLAMTRACAMP Negative 11/06/2023    SYPHT Nonreactive 03/05/2024    HEPBSAG Nonreactive 11/08/2023    HIV1X2 Nonreactive 11/08/2023    URINECULTURE >100,000 Escherichia coli (A) 11/06/2023     Lab Results   Component Value Date    GLUC1P 83 03/05/2024     No results found for: \"GRPBSTREP\"     Education provided    If you experience:     Contractions that are 5 minutes a part for 2 hours that you cannot walk or talk through   A gush of fluid from your vagina   Bleeding that is heavier than a period   Decrease fetal movements or changes in your baby's movements   A headache that does not go away with Tylenol or rest     During the weekday office hours please call [phone]    After hours please call [phone].  You will tell the answering service if you are planning on giving birth at [facility] or [facility] to be transferred to the correct midwife on call.      DO NOT USE [facility] MESSAGES - THEY ARE NOT MONITORED 24/7   "

## 2024-04-22 ENCOUNTER — INFUSION (OUTPATIENT)
Dept: INFUSION THERAPY | Facility: CLINIC | Age: 29
End: 2024-04-22
Payer: COMMERCIAL

## 2024-04-22 VITALS
OXYGEN SATURATION: 99 % | HEART RATE: 81 BPM | RESPIRATION RATE: 16 BRPM | SYSTOLIC BLOOD PRESSURE: 99 MMHG | WEIGHT: 158 LBS | BODY MASS INDEX: 29.85 KG/M2 | TEMPERATURE: 98.3 F | DIASTOLIC BLOOD PRESSURE: 51 MMHG

## 2024-04-22 DIAGNOSIS — O99.013 ANEMIA DURING PREGNANCY IN THIRD TRIMESTER (HHS-HCC): ICD-10-CM

## 2024-04-22 PROCEDURE — 96365 THER/PROPH/DIAG IV INF INIT: CPT | Performed by: NURSE PRACTITIONER

## 2024-04-22 RX ORDER — EPINEPHRINE 0.3 MG/.3ML
0.3 INJECTION SUBCUTANEOUS EVERY 5 MIN PRN
Status: CANCELLED | OUTPATIENT
Start: 2024-04-24

## 2024-04-22 RX ORDER — DIPHENHYDRAMINE HYDROCHLORIDE 50 MG/ML
50 INJECTION INTRAMUSCULAR; INTRAVENOUS AS NEEDED
Status: CANCELLED | OUTPATIENT
Start: 2024-04-24

## 2024-04-22 RX ORDER — FAMOTIDINE 10 MG/ML
20 INJECTION INTRAVENOUS ONCE AS NEEDED
Status: CANCELLED | OUTPATIENT
Start: 2024-04-24

## 2024-04-22 RX ORDER — ALBUTEROL SULFATE 0.83 MG/ML
3 SOLUTION RESPIRATORY (INHALATION) AS NEEDED
Status: CANCELLED | OUTPATIENT
Start: 2024-04-24

## 2024-04-22 ASSESSMENT — ENCOUNTER SYMPTOMS: FATIGUE: 1

## 2024-04-22 ASSESSMENT — PAIN SCALES - GENERAL: PAINLEVEL: 0-NO PAIN

## 2024-04-22 NOTE — PATIENT INSTRUCTIONS
Today :Cee Morales had no medications administered during this visit.     For:   1. Anemia during pregnancy in third trimester ([facility])         Your next appointment is due in:  one week        Please read the  Medication Guide that was given to you and reviewed during todays visit.     (Tell all doctors including dentists that you are taking this medication)     Go to the emergency room or call 911 if:  -You have signs of allergic reaction:   -Rash, hives, itching.   -Swollen, blistered, peeling skin.   -Swelling of face, lips, mouth, tongue or throat.   -Tightness of chest, trouble breathing, swallowing or talking     Call your doctor:  - If IV / injection site gets red, warm, swollen, itchy or leaks fluid or pus.     (Leave dressing on your IV site for at least 2 hours and keep area clean and dry  - If you get sick or have symptoms of infection or are not feeling well for any reason.    (Wash your hands often, stay away from people who are sick)  - If you have side effects from your medication that do not go away or are bothersome.     (Refer to the teaching your nurse gave you for side effects to call your doctor about)    - Common side effects may include:  stuffy nose, headache, feeling tired, muscle aches, upset stomach  - Before receiving any vaccines     - Call [facility] at   If:  - You get sick, are on antibiotics, have had a recent vaccine, have surgery or dental work and your doctor wants your visit rescheduled.  - You need to cancel and reschedule your visit for any reason. Call at least 2 days before your visit if you need to cancel.   - Your insurance changes before your next visit.    (We will need to get approval from your new insurance. This can take up to two weeks.)     [facility] is opened Monday thru Friday. We are closed on weekends and holidays.   Voice mail will take your call if the center is closed. If you leave a message please allow 24  hours for a call back during weekdays. If you leave a message on a weekend/holiday, we will call you back the next business day.

## 2024-04-22 NOTE — PROGRESS NOTES
[id number]   [facility]   Date: 2024   Name: Cee Morales  : 1995   MRN: [id number]         Reason for Visit: OP Infusion (venofer)   Today is dose 1/3      Visit Type: INFUSION       Ordered By: Melissa Hamm NP      Accompanied by:Self      Diagnosis: Anemia during pregnancy in third trimester ([facility])       Allergies:   Allergies as of 2024    (No Known Allergies)         Current Medications has a current medication list which includes the following prescription(s): prenatal vit no.130-iron-folic.       Vitals:   Vitals:    24 1247 24 1448   BP: 101/57 (!) 97/46   Pulse: 94 85   Resp: 16 16   Temp: 36.8 °C (98.3 °F) 36.8 °C (98.3 °F)   TempSrc: Temporal    SpO2: 99% 100%   Weight: 71.7 kg (158 lb)    PainSc: 0-No pain    LMP: 2023             Infusion Pre-procedure Checklist:   - Allergies reviewed: yes   - Medications reviewed: yes       - Previous reaction to current treatment: n/a      Assess patient for the concerns below. Document provider notification as appropriate.  - Active or recent infection with/without current antibiotic use: n/a  - Recent or planned invasive dental work: n/a  - Recent or planned surgeries: n/a  - Recently received or plans to receive vaccinations: n/a  - Has treatment related toxicities: n/a  - Is pregnant:  yes      Pain: 0   - Is the pain different from normal: n/a   - Is the pain tolerable: n/a   - Is your Doctor aware:  n/a      Labs: N/A         Fall Risk Screening:         Review Of Systems:  Review of Systems   Constitutional:  Positive for fatigue.   All other systems reviewed and are negative.        Infusion Readiness:   - Assessment Concerns Related to Infusion: No  - Provider notified: no      Document Below Only If Indicated:   New Patient Education:    NEW PATIENT MEDICATION EDUCATION PT PROVIDED WITH WRITTEN (Disrupt CK PT EDUCATION SHEET) AND VERBAL EDUCATION REGARDING  MEDICATION GIVEN. VERIFIED MEDICATION NAME WITH PATIENT AND DISCUSSED REASON FOR USE. BRIEFLY DISCUSSED HOW MEDICATION WORKS AND EDUCATED ON GOAL OF TREATMENT, FREQUENCY OF TREATMENT, ADVERSE RXN'S AND COMMON SIDE EFFECTS TO MONITOR FOR. INSTRUCTED PT TO ASSURE THAT ALL PROVIDERS INCLUDING DENTISTS ARE AWARE OF MEDICATION RECEIVED. DISCUSSED FLOW OF VISIT AND ORIENTED TO INFUSION CENTER. PT VERBALIZES UNDERSTANDING. CALL LIGHT PROVIDED AND PT AWARE TO ALERT STAFF OF ANY CONCERNS DURING TREATMENT.        Treatment Conditions & Drug Specific Questions:            Weight Based Drug Calculations:    WEIGHT BASED DRUGS: NOT APPLICABLE / FLAT DOSE          Initiated By: Linda Hancock RN   Time: 3:02 PM     We administered iron sucrose (Venofer) 300 mg in sodium chloride 0.9% 250 mL IV.   8128 Pt discharged after 30 minute observation, free from signs or symptoms of adverse reaction

## 2024-04-29 ENCOUNTER — INFUSION (OUTPATIENT)
Dept: INFUSION THERAPY | Facility: CLINIC | Age: 29
End: 2024-04-29
Payer: COMMERCIAL

## 2024-04-29 VITALS
SYSTOLIC BLOOD PRESSURE: 92 MMHG | HEART RATE: 69 BPM | RESPIRATION RATE: 17 BRPM | DIASTOLIC BLOOD PRESSURE: 62 MMHG | OXYGEN SATURATION: 99 % | TEMPERATURE: 97.8 F

## 2024-04-29 DIAGNOSIS — O99.013 ANEMIA DURING PREGNANCY IN THIRD TRIMESTER (HHS-HCC): ICD-10-CM

## 2024-04-29 PROCEDURE — 96365 THER/PROPH/DIAG IV INF INIT: CPT | Performed by: NURSE PRACTITIONER

## 2024-04-29 RX ORDER — DIPHENHYDRAMINE HYDROCHLORIDE 50 MG/ML
50 INJECTION INTRAMUSCULAR; INTRAVENOUS AS NEEDED
OUTPATIENT
Start: 2024-04-30

## 2024-04-29 RX ORDER — FAMOTIDINE 10 MG/ML
20 INJECTION INTRAVENOUS ONCE AS NEEDED
OUTPATIENT
Start: 2024-04-30

## 2024-04-29 RX ORDER — EPINEPHRINE 0.3 MG/.3ML
0.3 INJECTION SUBCUTANEOUS EVERY 5 MIN PRN
OUTPATIENT
Start: 2024-04-30

## 2024-04-29 RX ORDER — ALBUTEROL SULFATE 0.83 MG/ML
3 SOLUTION RESPIRATORY (INHALATION) AS NEEDED
OUTPATIENT
Start: 2024-04-30

## 2024-04-29 ASSESSMENT — PAIN SCALES - GENERAL: PAINLEVEL: 0-NO PAIN

## 2024-04-29 ASSESSMENT — ENCOUNTER SYMPTOMS: FATIGUE: 1

## 2024-04-29 NOTE — PROGRESS NOTES
[facility]   Infusion Clinic Note   Date: 2024   Name: Cee Morales  : 1995   MRN: [id number]         Reason for Visit: OP Infusion (Iron Sucrose Infusion 3 of 3 doses)         Visit Type: INFUSION- series complete today       Ordered By: TIARRA Hamm CNP      Accompanied by:Self      Diagnosis: Anemia during pregnancy in third trimester ([facility])       Allergies:   Allergies as of 2024    (No Known Allergies)         Current Medications has a current medication list which includes the following prescription(s): prenatal vit no.130-iron-folic.       Vitals:   Vitals:    24 1048 24 1240 24 1310   BP: 100/61 98/52 92/62   Pulse: 98 76 69   Resp: 18 17 17   Temp: 36.6 °C (97.8 °F)     SpO2: 98% 99% 99%   PainSc: 0-No pain     LMP: 2023             Infusion Pre-procedure Checklist:   - Allergies reviewed: yes   - Medications reviewed: yes       - Previous reaction to current treatment: no      Assess patient for the concerns below. Document provider notification as appropriate.  - Active or recent infection with/without current antibiotic use: n/a  - Recent or planned invasive dental work: n/a  - Recent or planned surgeries: n/a  - Recently received or plans to receive vaccinations: n/a  - Has treatment related toxicities: n/a  - Is pregnant:  yes- 36 week OB      Pain: 0   - Is the pain different from normal: n/a   - Is the pain tolerable: n/a   - Is your Doctor aware:  n/a      Labs:  reviewed from 3/5/2024         Fall Risk Screening: Arevalo Fall Risk  History of Falling, Immediate or Within 3 Months: No  Secondary Diagnosis: Yes  Ambulatory Aid: Walks without aid/bedrest/nurse assist  Intravenous Therapy/Heparin Lock: No  Gait/Transferring: Normal/bedrest/immobile  Mental Status: Oriented to own ability  Arevalo Fall Risk Score: 15       Review Of Systems:  Review of Systems   Constitutional:  Positive for fatigue.        36 week OB  (+) fetal  movement   All other systems reviewed and are negative.        Infusion Readiness:   - Assessment Concerns Related to Infusion: No  - Provider notified: no      Document Below Only If Indicated:   New Patient Education:    N/A (returning patient for continuation of therapy. Ongoing education provided as needed.)        Treatment Conditions & Drug Specific Questions:          Weight Based Drug Calculations:    WEIGHT BASED DRUGS: NOT APPLICABLE / FLAT DOSE          Initiated By: Kaley Merlos RN   Time: 1:28 PM     We administered iron sucrose (Venofer) 300 mg in sodium chloride 0.9% 250 mL IV.   1102 Patient semi- reclining during infusion.  1310 Patient tolerated infusion well.  VSS. No s/s adverse reaction noted.  Series complete today.

## 2024-04-29 NOTE — PATIENT INSTRUCTIONS
Today :We administered iron sucrose (Venofer) 300 mg in sodium chloride 0.9% 250 mL IV.     For:   1. Anemia during pregnancy in third trimester ([facility])         Your next appointment is due in:  TBD        Please read the  Medication Guide that was given to you and reviewed during todays visit.     (Tell all doctors including dentists that you are taking this medication)     Go to the emergency room or call 911 if:  -You have signs of allergic reaction:   -Rash, hives, itching.   -Swollen, blistered, peeling skin.   -Swelling of face, lips, mouth, tongue or throat.   -Tightness of chest, trouble breathing, swallowing or talking     Call your doctor:  - If IV / injection site gets red, warm, swollen, itchy or leaks fluid or pus.     (Leave dressing on your IV site for at least 2 hours and keep area clean and dry  - If you get sick or have symptoms of infection or are not feeling well for any reason.    (Wash your hands often, stay away from people who are sick)  - If you have side effects from your medication that do not go away or are bothersome.     (Refer to the teaching your nurse gave you for side effects to call your doctor about)    - Common side effects may include:  stuffy nose, headache, feeling tired, muscle aches, upset stomach  - Before receiving any vaccines     - Call [facility] at   If:  - You get sick, are on antibiotics, have had a recent vaccine, have surgery or dental work and your doctor wants your visit rescheduled.  - You need to cancel and reschedule your visit for any reason. Call at least 2 days before your visit if you need to cancel.   - Your insurance changes before your next visit.    (We will need to get approval from your new insurance. This can take up to two weeks.)     [facility] is opened Monday thru Friday. We are closed on weekends and holidays.   Voice mail will take your call if the center is closed. If you leave a message please  allow 24 hours for a call back during weekdays. If you leave a message on a weekend/holiday, we will call you back the next business day.

## 2024-04-30 ENCOUNTER — ROUTINE PRENATAL (OUTPATIENT)
Dept: OBSTETRICS AND GYNECOLOGY | Facility: CLINIC | Age: 29
End: 2024-04-30
Payer: COMMERCIAL

## 2024-04-30 VITALS — DIASTOLIC BLOOD PRESSURE: 70 MMHG | BODY MASS INDEX: 29.66 KG/M2 | SYSTOLIC BLOOD PRESSURE: 110 MMHG | WEIGHT: 157 LBS

## 2024-04-30 DIAGNOSIS — O09.93 HIGH-RISK PREGNANCY IN THIRD TRIMESTER (HHS-HCC): Primary | ICD-10-CM

## 2024-04-30 DIAGNOSIS — Z59.41 FOOD INSECURITY: ICD-10-CM

## 2024-04-30 DIAGNOSIS — O99.013 ANEMIA DURING PREGNANCY IN THIRD TRIMESTER (HHS-HCC): ICD-10-CM

## 2024-04-30 DIAGNOSIS — O99.330 TOBACCO USE AFFECTING PREGNANCY, ANTEPARTUM (HHS-HCC): ICD-10-CM

## 2024-04-30 PROCEDURE — 87081 CULTURE SCREEN ONLY: CPT

## 2024-04-30 PROCEDURE — 99214 OFFICE O/P EST MOD 30 MIN: CPT | Performed by: OBSTETRICS & GYNECOLOGY

## 2024-04-30 SDOH — ECONOMIC STABILITY - FOOD INSECURITY: FOOD INSECURITY: Z59.41

## 2024-04-30 NOTE — PROGRESS NOTES
The patient reports good fetal movement.  She is without symptoms of PTL or PEC.  She is ready for delivery.  She received her last iron infusion yesterday.      A/P:     -  Patient received IV iron, recheck CBC in 1-2 weeks     -  Fetal movement info     -  GBS cx sent

## 2024-05-03 LAB — GP B STREP GENITAL QL CULT: ABNORMAL

## 2024-05-07 ENCOUNTER — ROUTINE PRENATAL (OUTPATIENT)
Dept: OBSTETRICS AND GYNECOLOGY | Facility: CLINIC | Age: 29
End: 2024-05-07
Payer: COMMERCIAL

## 2024-05-07 VITALS — DIASTOLIC BLOOD PRESSURE: 60 MMHG | BODY MASS INDEX: 29.85 KG/M2 | WEIGHT: 158 LBS | SYSTOLIC BLOOD PRESSURE: 114 MMHG

## 2024-05-07 DIAGNOSIS — O09.93 HIGH-RISK PREGNANCY IN THIRD TRIMESTER (HHS-HCC): Primary | ICD-10-CM

## 2024-05-07 DIAGNOSIS — Z59.41 FOOD INSECURITY: ICD-10-CM

## 2024-05-07 DIAGNOSIS — O99.330 TOBACCO USE AFFECTING PREGNANCY, ANTEPARTUM (HHS-HCC): ICD-10-CM

## 2024-05-07 DIAGNOSIS — O26.849 FETAL SIZE INCONSISTENT WITH DATES (HHS-HCC): ICD-10-CM

## 2024-05-07 PROCEDURE — 99214 OFFICE O/P EST MOD 30 MIN: CPT | Performed by: OBSTETRICS & GYNECOLOGY

## 2024-05-07 SDOH — ECONOMIC STABILITY - FOOD INSECURITY: FOOD INSECURITY: Z59.41

## 2024-05-07 NOTE — PROGRESS NOTES
She reports good fetal movement.  She is without symptoms of PTL or PEC.  She is ready for delivery.  She would like an induction of labor.    She request a cervical exam today.    A/P: HROB - Food insecurity, size slightly less than dates, smoker     -  F/U US for growth    -  39 week IOL     -  Fetal movement     -  Recheck CBC

## 2024-05-09 ENCOUNTER — TELEPHONE (OUTPATIENT)
Dept: OBSTETRICS AND GYNECOLOGY | Facility: CLINIC | Age: 29
End: 2024-05-09
Payer: COMMERCIAL

## 2024-05-09 ENCOUNTER — PREP FOR PROCEDURE (OUTPATIENT)
Dept: OBSTETRICS AND GYNECOLOGY | Facility: CLINIC | Age: 29
End: 2024-05-09
Payer: COMMERCIAL

## 2024-05-09 DIAGNOSIS — O09.93 HIGH-RISK PREGNANCY IN THIRD TRIMESTER (HHS-HCC): Primary | ICD-10-CM

## 2024-05-09 NOTE — TELEPHONE ENCOUNTER
Name/ verified  Per Dr. Leonard's verbal request pt is scheduled for a 39.2 wk IOL on 24 at 8am at [facility]. IOL instructions reviewed with pt.  Pt denies any questions. Verbalizes understanding. Message sent to Dr. Leonard via secure chat requesting the order for IOL to be placed.

## 2024-05-13 ENCOUNTER — HOSPITAL ENCOUNTER (OUTPATIENT)
Dept: RADIOLOGY | Facility: HOSPITAL | Age: 29
Discharge: HOME | End: 2024-05-13
Payer: COMMERCIAL

## 2024-05-13 DIAGNOSIS — O26.849 FETAL SIZE INCONSISTENT WITH DATES (HHS-HCC): ICD-10-CM

## 2024-05-13 PROCEDURE — 76819 FETAL BIOPHYS PROFIL W/O NST: CPT | Performed by: OBSTETRICS & GYNECOLOGY

## 2024-05-13 PROCEDURE — 76816 OB US FOLLOW-UP PER FETUS: CPT

## 2024-05-13 PROCEDURE — 76816 OB US FOLLOW-UP PER FETUS: CPT | Performed by: OBSTETRICS & GYNECOLOGY

## 2024-05-13 PROCEDURE — 76819 FETAL BIOPHYS PROFIL W/O NST: CPT

## 2024-05-13 PROCEDURE — 76820 UMBILICAL ARTERY ECHO: CPT | Performed by: OBSTETRICS & GYNECOLOGY

## 2024-05-13 PROCEDURE — 76820 UMBILICAL ARTERY ECHO: CPT

## 2024-05-14 ENCOUNTER — TELEPHONE (OUTPATIENT)
Dept: OBSTETRICS AND GYNECOLOGY | Facility: CLINIC | Age: 29
End: 2024-05-14
Payer: COMMERCIAL

## 2024-05-16 ENCOUNTER — APPOINTMENT (OUTPATIENT)
Dept: OBSTETRICS AND GYNECOLOGY | Facility: CLINIC | Age: 29
End: 2024-05-16
Payer: COMMERCIAL

## 2024-05-16 ENCOUNTER — CLINICAL SUPPORT (OUTPATIENT)
Dept: OBSTETRICS AND GYNECOLOGY | Facility: CLINIC | Age: 29
End: 2024-05-16
Payer: COMMERCIAL

## 2024-05-16 VITALS — BODY MASS INDEX: 30.99 KG/M2 | DIASTOLIC BLOOD PRESSURE: 68 MMHG | SYSTOLIC BLOOD PRESSURE: 114 MMHG | WEIGHT: 164 LBS

## 2024-05-16 DIAGNOSIS — Z34.90 ENCOUNTER FOR INDUCTION OF LABOR (HHS-HCC): ICD-10-CM

## 2024-05-16 NOTE — TELEPHONE ENCOUNTER
Name/ verified  Harini with OB scheduling called this RN to change pt's IOL date to 24 at 1400 at [facility]..   Dr. Leonard made aware and requested to place order ASAP.   Pt aware and agreed.

## 2024-05-16 NOTE — PROGRESS NOTES
Pt here for NST at 38wks gest for FGR  Placed on monitor at 2:20pm  MHR 84 FHR  146  Strip reviewed by Dr. Tejada  and agreed that strip is reactive.   Pt removed from NST at  2:46pm  Pt denies any c/o and further questions at this time.  114/68  164 pounds

## 2024-05-17 ENCOUNTER — PREP FOR PROCEDURE (OUTPATIENT)
Dept: OBSTETRICS AND GYNECOLOGY | Facility: CLINIC | Age: 29
End: 2024-05-17
Payer: COMMERCIAL

## 2024-05-20 ENCOUNTER — HOSPITAL ENCOUNTER (OUTPATIENT)
Dept: RADIOLOGY | Facility: HOSPITAL | Age: 29
Discharge: HOME | End: 2024-05-20
Payer: COMMERCIAL

## 2024-05-20 DIAGNOSIS — O26.849 FETAL SIZE INCONSISTENT WITH DATES (HHS-HCC): ICD-10-CM

## 2024-05-20 PROCEDURE — 76819 FETAL BIOPHYS PROFIL W/O NST: CPT

## 2024-05-20 PROCEDURE — 76820 UMBILICAL ARTERY ECHO: CPT | Performed by: OBSTETRICS & GYNECOLOGY

## 2024-05-20 PROCEDURE — 76819 FETAL BIOPHYS PROFIL W/O NST: CPT | Performed by: OBSTETRICS & GYNECOLOGY

## 2024-05-20 PROCEDURE — 76820 UMBILICAL ARTERY ECHO: CPT

## 2024-05-21 ENCOUNTER — APPOINTMENT (OUTPATIENT)
Dept: OBSTETRICS AND GYNECOLOGY | Facility: CLINIC | Age: 29
End: 2024-05-21
Payer: COMMERCIAL

## 2024-05-21 ENCOUNTER — ROUTINE PRENATAL (OUTPATIENT)
Dept: OBSTETRICS AND GYNECOLOGY | Facility: CLINIC | Age: 29
End: 2024-05-21
Payer: COMMERCIAL

## 2024-05-21 VITALS — WEIGHT: 164 LBS | SYSTOLIC BLOOD PRESSURE: 110 MMHG | BODY MASS INDEX: 30.99 KG/M2 | DIASTOLIC BLOOD PRESSURE: 60 MMHG

## 2024-05-21 DIAGNOSIS — O26.849 FETAL SIZE INCONSISTENT WITH DATES (HHS-HCC): ICD-10-CM

## 2024-05-21 DIAGNOSIS — O09.93 HIGH-RISK PREGNANCY IN THIRD TRIMESTER (HHS-HCC): Primary | ICD-10-CM

## 2024-05-21 DIAGNOSIS — Z59.41 FOOD INSECURITY: ICD-10-CM

## 2024-05-21 DIAGNOSIS — O99.013 ANEMIA DURING PREGNANCY IN THIRD TRIMESTER (HHS-HCC): ICD-10-CM

## 2024-05-21 DIAGNOSIS — O99.330 TOBACCO USE AFFECTING PREGNANCY, ANTEPARTUM (HHS-HCC): ICD-10-CM

## 2024-05-21 PROCEDURE — 99214 OFFICE O/P EST MOD 30 MIN: CPT | Performed by: OBSTETRICS & GYNECOLOGY

## 2024-05-21 PROCEDURE — 59025 FETAL NON-STRESS TEST: CPT | Performed by: OBSTETRICS & GYNECOLOGY

## 2024-05-21 SDOH — ECONOMIC STABILITY - FOOD INSECURITY: FOOD INSECURITY: Z59.41

## 2024-05-21 NOTE — PROGRESS NOTES
"Patient reports some change in fetal movement over the last few weeks.  She initially said decreased, but now reports that it \"feels different than before\". She is without symptoms of active labor or PEC.  She is ready for induction scheduled on Friday-3 days for now.    O: NST   May 20th BPP= 8/8     A/P: HROB - Decreased interval growth, smoker, ? Decreased/change in FM     -  IOL scheduled    -  Fetal movement re d/w the paient   "

## 2024-05-24 ENCOUNTER — HOSPITAL ENCOUNTER (INPATIENT)
Facility: HOSPITAL | Age: 29
LOS: 2 days | Discharge: HOME | End: 2024-05-26
Attending: OBSTETRICS & GYNECOLOGY | Admitting: STUDENT IN AN ORGANIZED HEALTH CARE EDUCATION/TRAINING PROGRAM
Payer: COMMERCIAL

## 2024-05-24 ENCOUNTER — ANESTHESIA (OUTPATIENT)
Dept: OBSTETRICS AND GYNECOLOGY | Facility: HOSPITAL | Age: 29
End: 2024-05-24
Payer: COMMERCIAL

## 2024-05-24 ENCOUNTER — APPOINTMENT (OUTPATIENT)
Dept: OBSTETRICS AND GYNECOLOGY | Facility: HOSPITAL | Age: 29
End: 2024-05-24
Payer: COMMERCIAL

## 2024-05-24 ENCOUNTER — ANESTHESIA EVENT (OUTPATIENT)
Dept: OBSTETRICS AND GYNECOLOGY | Facility: HOSPITAL | Age: 29
End: 2024-05-24
Payer: COMMERCIAL

## 2024-05-24 DIAGNOSIS — Z34.90 ENCOUNTER FOR INDUCTION OF LABOR (HHS-HCC): ICD-10-CM

## 2024-05-24 LAB
ABO GROUP (TYPE) IN BLOOD: NORMAL
ANTIBODY SCREEN: NORMAL
ERYTHROCYTE [DISTWIDTH] IN BLOOD BY AUTOMATED COUNT: 13.1 % (ref 11.5–14.5)
HCT VFR BLD AUTO: 35.6 % (ref 36–46)
HGB BLD-MCNC: 11.3 G/DL (ref 12–16)
MCH RBC QN AUTO: 28.3 PG (ref 26–34)
MCHC RBC AUTO-ENTMCNC: 31.7 G/DL (ref 32–36)
MCV RBC AUTO: 89 FL (ref 80–100)
NRBC BLD-RTO: 0 /100 WBCS (ref 0–0)
PLATELET # BLD AUTO: 252 X10*3/UL (ref 150–450)
RBC # BLD AUTO: 3.99 X10*6/UL (ref 4–5.2)
RH FACTOR (ANTIGEN D): NORMAL
TREPONEMA PALLIDUM IGG+IGM AB [PRESENCE] IN SERUM OR PLASMA BY IMMUNOASSAY: NONREACTIVE
WBC # BLD AUTO: 11.7 X10*3/UL (ref 4.4–11.3)

## 2024-05-24 PROCEDURE — 85027 COMPLETE CBC AUTOMATED: CPT

## 2024-05-24 PROCEDURE — 2500000004 HC RX 250 GENERAL PHARMACY W/ HCPCS (ALT 636 FOR OP/ED)

## 2024-05-24 PROCEDURE — 3700000002 HC GENERAL ANESTHESIA TIME - EACH INCREMENTAL 1 MINUTE: Performed by: ANESTHESIOLOGY

## 2024-05-24 PROCEDURE — 51701 INSERT BLADDER CATHETER: CPT

## 2024-05-24 PROCEDURE — 3700000001 HC GENERAL ANESTHESIA TIME - INITIAL BASE CHARGE: Performed by: ANESTHESIOLOGY

## 2024-05-24 PROCEDURE — 59409 OBSTETRICAL CARE: CPT | Performed by: STUDENT IN AN ORGANIZED HEALTH CARE EDUCATION/TRAINING PROGRAM

## 2024-05-24 PROCEDURE — 36415 COLL VENOUS BLD VENIPUNCTURE: CPT

## 2024-05-24 PROCEDURE — 7100000016 HC LABOR RECOVERY PER HOUR

## 2024-05-24 PROCEDURE — 2500000005 HC RX 250 GENERAL PHARMACY W/O HCPCS: Performed by: STUDENT IN AN ORGANIZED HEALTH CARE EDUCATION/TRAINING PROGRAM

## 2024-05-24 PROCEDURE — 2500000001 HC RX 250 WO HCPCS SELF ADMINISTERED DRUGS (ALT 637 FOR MEDICARE OP)

## 2024-05-24 PROCEDURE — 7210000002 HC LABOR PER HOUR

## 2024-05-24 PROCEDURE — 0KQM0ZZ REPAIR PERINEUM MUSCLE, OPEN APPROACH: ICD-10-PCS | Performed by: STUDENT IN AN ORGANIZED HEALTH CARE EDUCATION/TRAINING PROGRAM

## 2024-05-24 PROCEDURE — 3700000014 EPIDURAL BLOCK: Mod: GC | Performed by: STUDENT IN AN ORGANIZED HEALTH CARE EDUCATION/TRAINING PROGRAM

## 2024-05-24 PROCEDURE — 3E033VJ INTRODUCTION OF OTHER HORMONE INTO PERIPHERAL VEIN, PERCUTANEOUS APPROACH: ICD-10-PCS | Performed by: STUDENT IN AN ORGANIZED HEALTH CARE EDUCATION/TRAINING PROGRAM

## 2024-05-24 PROCEDURE — 01967 NEURAXL LBR ANES VAG DLVR: CPT | Performed by: ANESTHESIOLOGY

## 2024-05-24 PROCEDURE — 86901 BLOOD TYPING SEROLOGIC RH(D): CPT

## 2024-05-24 PROCEDURE — 59409 OBSTETRICAL CARE: CPT

## 2024-05-24 PROCEDURE — 86780 TREPONEMA PALLIDUM: CPT

## 2024-05-24 PROCEDURE — 1210000001 HC SEMI-PRIVATE ROOM DAILY

## 2024-05-24 RX ORDER — MINERAL OIL
OIL (ML) ORAL
Status: DISPENSED
Start: 2024-05-24 | End: 2024-05-25

## 2024-05-24 RX ORDER — MISOPROSTOL 200 UG/1
800 TABLET ORAL ONCE AS NEEDED
Status: DISCONTINUED | OUTPATIENT
Start: 2024-05-24 | End: 2024-05-24 | Stop reason: HOSPADM

## 2024-05-24 RX ORDER — SODIUM CHLORIDE, SODIUM LACTATE, POTASSIUM CHLORIDE, CALCIUM CHLORIDE 600; 310; 30; 20 MG/100ML; MG/100ML; MG/100ML; MG/100ML
125 INJECTION, SOLUTION INTRAVENOUS CONTINUOUS
Status: DISCONTINUED | OUTPATIENT
Start: 2024-05-24 | End: 2024-05-25

## 2024-05-24 RX ORDER — ONDANSETRON 4 MG/1
4 TABLET, FILM COATED ORAL EVERY 6 HOURS PRN
Status: DISCONTINUED | OUTPATIENT
Start: 2024-05-24 | End: 2024-05-24 | Stop reason: HOSPADM

## 2024-05-24 RX ORDER — LIDOCAINE HYDROCHLORIDE 10 MG/ML
30 INJECTION INFILTRATION; PERINEURAL ONCE AS NEEDED
Status: DISCONTINUED | OUTPATIENT
Start: 2024-05-24 | End: 2024-05-24 | Stop reason: HOSPADM

## 2024-05-24 RX ORDER — METHYLERGONOVINE MALEATE 0.2 MG/ML
0.2 INJECTION INTRAVENOUS ONCE AS NEEDED
Status: DISCONTINUED | OUTPATIENT
Start: 2024-05-24 | End: 2024-05-24 | Stop reason: HOSPADM

## 2024-05-24 RX ORDER — METOCLOPRAMIDE 10 MG/1
10 TABLET ORAL EVERY 6 HOURS PRN
Status: DISCONTINUED | OUTPATIENT
Start: 2024-05-24 | End: 2024-05-24 | Stop reason: HOSPADM

## 2024-05-24 RX ORDER — PENICILLIN G 3000000 [IU]/50ML
3 INJECTION, SOLUTION INTRAVENOUS EVERY 4 HOURS
Status: DISCONTINUED | OUTPATIENT
Start: 2024-05-24 | End: 2024-05-24 | Stop reason: HOSPADM

## 2024-05-24 RX ORDER — NIFEDIPINE 10 MG/1
10 CAPSULE ORAL ONCE AS NEEDED
Status: DISCONTINUED | OUTPATIENT
Start: 2024-05-24 | End: 2024-05-24 | Stop reason: HOSPADM

## 2024-05-24 RX ORDER — OXYTOCIN/0.9 % SODIUM CHLORIDE 30/500 ML
2-30 PLASTIC BAG, INJECTION (ML) INTRAVENOUS CONTINUOUS
Status: DISCONTINUED | OUTPATIENT
Start: 2024-05-24 | End: 2024-05-25

## 2024-05-24 RX ORDER — FENTANYL/BUPIVACAINE/NS/PF 2MCG/ML-.1
PLASTIC BAG, INJECTION (ML) INJECTION AS NEEDED
Status: DISCONTINUED | OUTPATIENT
Start: 2024-05-24 | End: 2024-05-24

## 2024-05-24 RX ORDER — ACETAMINOPHEN 325 MG/1
975 TABLET ORAL EVERY 6 HOURS
Status: DISCONTINUED | OUTPATIENT
Start: 2024-05-24 | End: 2024-05-26 | Stop reason: HOSPADM

## 2024-05-24 RX ORDER — CARBOPROST TROMETHAMINE 250 UG/ML
250 INJECTION, SOLUTION INTRAMUSCULAR ONCE AS NEEDED
Status: DISCONTINUED | OUTPATIENT
Start: 2024-05-24 | End: 2024-05-24 | Stop reason: HOSPADM

## 2024-05-24 RX ORDER — LABETALOL HYDROCHLORIDE 5 MG/ML
20 INJECTION, SOLUTION INTRAVENOUS ONCE AS NEEDED
Status: DISCONTINUED | OUTPATIENT
Start: 2024-05-24 | End: 2024-05-24 | Stop reason: HOSPADM

## 2024-05-24 RX ORDER — IBUPROFEN 600 MG/1
600 TABLET ORAL EVERY 6 HOURS
Status: DISCONTINUED | OUTPATIENT
Start: 2024-05-24 | End: 2024-05-26 | Stop reason: HOSPADM

## 2024-05-24 RX ORDER — OXYTOCIN 10 [USP'U]/ML
10 INJECTION, SOLUTION INTRAMUSCULAR; INTRAVENOUS ONCE AS NEEDED
Status: DISCONTINUED | OUTPATIENT
Start: 2024-05-24 | End: 2024-05-24 | Stop reason: HOSPADM

## 2024-05-24 RX ORDER — OXYTOCIN/0.9 % SODIUM CHLORIDE 30/500 ML
60 PLASTIC BAG, INJECTION (ML) INTRAVENOUS ONCE AS NEEDED
Status: DISCONTINUED | OUTPATIENT
Start: 2024-05-24 | End: 2024-05-24 | Stop reason: HOSPADM

## 2024-05-24 RX ORDER — FENTANYL/BUPIVACAINE/NS/PF 2MCG/ML-.1
PLASTIC BAG, INJECTION (ML) INJECTION CONTINUOUS PRN
Status: DISCONTINUED | OUTPATIENT
Start: 2024-05-24 | End: 2024-05-24

## 2024-05-24 RX ORDER — HYDRALAZINE HYDROCHLORIDE 20 MG/ML
5 INJECTION INTRAMUSCULAR; INTRAVENOUS ONCE AS NEEDED
Status: DISCONTINUED | OUTPATIENT
Start: 2024-05-24 | End: 2024-05-24 | Stop reason: HOSPADM

## 2024-05-24 RX ORDER — TRANEXAMIC ACID 100 MG/ML
1000 INJECTION, SOLUTION INTRAVENOUS ONCE AS NEEDED
Status: DISCONTINUED | OUTPATIENT
Start: 2024-05-24 | End: 2024-05-24 | Stop reason: HOSPADM

## 2024-05-24 RX ORDER — DIPHENHYDRAMINE HYDROCHLORIDE 50 MG/ML
10 INJECTION INTRAMUSCULAR; INTRAVENOUS ONCE
Status: COMPLETED | OUTPATIENT
Start: 2024-05-24 | End: 2024-05-24

## 2024-05-24 RX ORDER — TERBUTALINE SULFATE 1 MG/ML
0.25 INJECTION SUBCUTANEOUS ONCE AS NEEDED
Status: DISCONTINUED | OUTPATIENT
Start: 2024-05-24 | End: 2024-05-24 | Stop reason: HOSPADM

## 2024-05-24 RX ORDER — LIDOCAINE HCL/EPINEPHRINE/PF 2%-1:200K
VIAL (ML) INJECTION AS NEEDED
Status: DISCONTINUED | OUTPATIENT
Start: 2024-05-24 | End: 2024-05-24

## 2024-05-24 RX ORDER — METOCLOPRAMIDE HYDROCHLORIDE 5 MG/ML
10 INJECTION INTRAMUSCULAR; INTRAVENOUS EVERY 6 HOURS PRN
Status: DISCONTINUED | OUTPATIENT
Start: 2024-05-24 | End: 2024-05-24 | Stop reason: HOSPADM

## 2024-05-24 RX ORDER — LOPERAMIDE HYDROCHLORIDE 2 MG/1
4 CAPSULE ORAL EVERY 2 HOUR PRN
Status: DISCONTINUED | OUTPATIENT
Start: 2024-05-24 | End: 2024-05-24 | Stop reason: HOSPADM

## 2024-05-24 RX ORDER — ONDANSETRON HYDROCHLORIDE 2 MG/ML
4 INJECTION, SOLUTION INTRAVENOUS EVERY 6 HOURS PRN
Status: DISCONTINUED | OUTPATIENT
Start: 2024-05-24 | End: 2024-05-24 | Stop reason: HOSPADM

## 2024-05-24 RX ADMIN — FENTANYL CITRATE 5 ML: 50 INJECTION INTRAMUSCULAR; INTRAVENOUS at 18:25

## 2024-05-24 RX ADMIN — SODIUM CHLORIDE, POTASSIUM CHLORIDE, SODIUM LACTATE AND CALCIUM CHLORIDE 125 ML/HR: 600; 310; 30; 20 INJECTION, SOLUTION INTRAVENOUS at 15:18

## 2024-05-24 RX ADMIN — PENICILLIN G POTASSIUM 5 MILLION UNITS: 5000000 INJECTION, POWDER, FOR SOLUTION INTRAMUSCULAR; INTRAVENOUS at 15:38

## 2024-05-24 RX ADMIN — LIDOCAINE HYDROCHLORIDE,EPINEPHRINE BITARTRATE 3 ML: 20; .005 INJECTION, SOLUTION EPIDURAL; INFILTRATION; INTRACAUDAL; PERINEURAL at 18:21

## 2024-05-24 RX ADMIN — FENTANYL CITRATE 3 ML: 50 INJECTION INTRAMUSCULAR; INTRAVENOUS at 18:23

## 2024-05-24 RX ADMIN — SODIUM CHLORIDE, POTASSIUM CHLORIDE, SODIUM LACTATE AND CALCIUM CHLORIDE 500 ML: 600; 310; 30; 20 INJECTION, SOLUTION INTRAVENOUS at 18:00

## 2024-05-24 RX ADMIN — FENTANYL CITRATE 2 ML: 50 INJECTION INTRAMUSCULAR; INTRAVENOUS at 18:24

## 2024-05-24 RX ADMIN — DIPHENHYDRAMINE HYDROCHLORIDE 10 MG: 50 INJECTION, SOLUTION INTRAMUSCULAR; INTRAVENOUS at 22:35

## 2024-05-24 RX ADMIN — IBUPROFEN 600 MG: 600 TABLET, FILM COATED ORAL at 21:46

## 2024-05-24 RX ADMIN — FENTANYL CITRATE 14 ML/HR: 50 INJECTION, SOLUTION INTRAMUSCULAR; INTRAVENOUS at 18:26

## 2024-05-24 RX ADMIN — ACETAMINOPHEN 975 MG: 325 TABLET ORAL at 21:46

## 2024-05-24 RX ADMIN — Medication 2 MILLI-UNITS/MIN: at 16:59

## 2024-05-24 SDOH — SOCIAL STABILITY: SOCIAL INSECURITY: HAVE YOU HAD THOUGHTS OF HARMING ANYONE ELSE?: NO

## 2024-05-24 SDOH — SOCIAL STABILITY: SOCIAL INSECURITY: ARE THERE ANY APPARENT SIGNS OF INJURIES/BEHAVIORS THAT COULD BE RELATED TO ABUSE/NEGLECT?: NO

## 2024-05-24 SDOH — SOCIAL STABILITY: SOCIAL INSECURITY: DO YOU FEEL ANYONE HAS EXPLOITED OR TAKEN ADVANTAGE OF YOU FINANCIALLY OR OF YOUR PERSONAL PROPERTY?: NO

## 2024-05-24 SDOH — HEALTH STABILITY: MENTAL HEALTH: WISH TO BE DEAD (PAST 1 MONTH): NO

## 2024-05-24 SDOH — HEALTH STABILITY: MENTAL HEALTH: SUICIDAL BEHAVIOR (LIFETIME): NO

## 2024-05-24 SDOH — SOCIAL STABILITY: SOCIAL INSECURITY: PHYSICAL ABUSE: DENIES

## 2024-05-24 SDOH — HEALTH STABILITY: MENTAL HEALTH: WERE YOU ABLE TO COMPLETE ALL THE BEHAVIORAL HEALTH SCREENINGS?: YES

## 2024-05-24 SDOH — HEALTH STABILITY: MENTAL HEALTH: CURRENT SMOKER: 1

## 2024-05-24 SDOH — SOCIAL STABILITY: SOCIAL INSECURITY: DOES ANYONE TRY TO KEEP YOU FROM HAVING/CONTACTING OTHER FRIENDS OR DOING THINGS OUTSIDE YOUR HOME?: NO

## 2024-05-24 SDOH — SOCIAL STABILITY: SOCIAL INSECURITY: HAS ANYONE EVER THREATENED TO HURT YOUR FAMILY OR YOUR PETS?: NO

## 2024-05-24 SDOH — ECONOMIC STABILITY: HOUSING INSECURITY: DO YOU FEEL UNSAFE GOING BACK TO THE PLACE WHERE YOU ARE LIVING?: NO

## 2024-05-24 SDOH — SOCIAL STABILITY: SOCIAL INSECURITY: VERBAL ABUSE: DENIES

## 2024-05-24 SDOH — SOCIAL STABILITY: SOCIAL INSECURITY: HAVE YOU HAD ANY THOUGHTS OF HARMING ANYONE ELSE?: NO

## 2024-05-24 SDOH — SOCIAL STABILITY: SOCIAL INSECURITY: ABUSE SCREEN: ADULT

## 2024-05-24 SDOH — SOCIAL STABILITY: SOCIAL INSECURITY: ARE YOU OR HAVE YOU BEEN THREATENED OR ABUSED PHYSICALLY, EMOTIONALLY, OR SEXUALLY BY ANYONE?: NO

## 2024-05-24 SDOH — HEALTH STABILITY: MENTAL HEALTH: NON-SPECIFIC ACTIVE SUICIDAL THOUGHTS (PAST 1 MONTH): NO

## 2024-05-24 ASSESSMENT — LIFESTYLE VARIABLES
HOW OFTEN DO YOU HAVE 6 OR MORE DRINKS ON ONE OCCASION: NEVER
AUDIT-C TOTAL SCORE: 0
SKIP TO QUESTIONS 9-10: 1
HOW OFTEN DO YOU HAVE A DRINK CONTAINING ALCOHOL: NEVER
HOW MANY STANDARD DRINKS CONTAINING ALCOHOL DO YOU HAVE ON A TYPICAL DAY: PATIENT DOES NOT DRINK
AUDIT-C TOTAL SCORE: 0

## 2024-05-24 ASSESSMENT — PATIENT HEALTH QUESTIONNAIRE - PHQ9
SUM OF ALL RESPONSES TO PHQ9 QUESTIONS 1 & 2: 0
2. FEELING DOWN, DEPRESSED OR HOPELESS: NOT AT ALL
1. LITTLE INTEREST OR PLEASURE IN DOING THINGS: NOT AT ALL

## 2024-05-24 ASSESSMENT — PAIN SCALES - GENERAL
PAINLEVEL_OUTOF10: 0 - NO PAIN

## 2024-05-24 ASSESSMENT — ACTIVITIES OF DAILY LIVING (ADL): LACK_OF_TRANSPORTATION: NO

## 2024-05-24 NOTE — ANESTHESIA PREPROCEDURE EVALUATION
Patient: Cee Morales    Evaluation Method: In-person visit    Procedure Information    Date: 05/24/24  Procedure: Labor Consult         Relevant Problems   Cardiac (within normal limits)      Pulmonary (within normal limits)      GI (within normal limits)      /Renal (within normal limits)      Liver (within normal limits)      Endocrine (within normal limits)      Hematology   (+) Anemia of pregnancy ([facility])      Musculoskeletal (within normal limits)      HEENT (within normal limits)      ID   (+) Verruca      Skin (within normal limits)      GYN (within normal limits)       Clinical information reviewed:   Tobacco  Allergies  Meds   Med Hx  Surg Hx   Fam Hx          NPO Detail:  No data recorded     OB/Gyn Evaluation    Present Pregnancy    Patient is pregnant now.   Obstetric History                Physical Exam    Airway  Mallampati: I  TM distance: >3 FB  Neck ROM: full     Cardiovascular - normal exam     Dental - normal exam     Pulmonary - normal exam     Abdominal - normal exam             Anesthesia Plan    History of general anesthesia?: no  History of complications of general anesthesia?: no    ASA 2     epidural     The patient is a current smoker.  Patient did not smoke on day of procedure.    Anesthetic plan and risks discussed with patient.    Plan discussed with attending.

## 2024-05-24 NOTE — H&P
Obstetrical Admission History and Physical    Chief Complaint: rr IOL    Assessment/Plan    29yo  at 39.1wga by 11wk US presenting for term IOL     IOL  - Admitted, consented, scanned - cephalic  - Discussed risks and benefits of vaginal delivery including bleeding, infection, injury to surrounding structures. Pt expressed understanding, strongly desires vaginal delivery.   - SVE on admission: 3/50/-3  - Initiate pitocin per protocol  - For AROM when appropriate     Maternal wellbeing  - Type & screen, CBC on admission. Possible T&C pending admission labs.  - Epidural/IV pain meds per pt request  - PPBC: Nexplanon     Fetal wellbeing  - CEFM  - GBS pos, for PCN    Seen and discussed with Dr. Delatorre.   Karolina Foster MD PGY-1 OB/GYN     Principal Problem:    Labor and delivery, indication for care ([facility])      -----------------------------------------------------------------------------------------------------    Subjective   29yo  at 39.1wga by 11wk US presenting for term IOL     Pregnancy c/b  - tobacco use in pregnancy   - anemia in pregnancy - s/p IV iron x3    Obstetrical History   OB History    Para Term  AB Living   2 1 1 0 0 1   SAB IAB Ectopic Multiple Live Births   0 0 0 0 1      # Outcome Date GA Lbr Farhan/2nd Weight Sex Delivery Anes PTL Lv   2 Current            1 Term 10/25/21   2.722 kg F Vag-Spont EPI N LA       Past Medical History  Past Medical History:   Diagnosis Date    Other specified health status     No pertinent past medical history        Past Surgical History   Past Surgical History:   Procedure Laterality Date    WISDOM TOOTH EXTRACTION         Social History  Social History     Tobacco Use    Smoking status: Every Day     Types: Cigars    Smokeless tobacco: Current   Substance Use Topics    Alcohol use: Never     Substance and Sexual Activity   Drug Use Yes    Types: Marijuana    Comment: 1 B&M daily       Allergies  Patient has no known allergies.      Medications  Medications Prior to Admission   Medication Sig Dispense Refill Last Dose    prenatal vitamin, iron-folic, (prenatal vit no.130-iron-folic) 27 mg iron-800 mcg folic acid tablet Take 1 tablet by mouth once daily. 90 tablet 3 5/23/2024       Objective    Last Vitals  Temp Pulse Resp BP MAP O2 Sat   36.8 °C (98.2 °F) 80 16 103/55   98 %     Physical Examination  General: Lying in bed in NAD  Obstetric:   FHT: 120 bpm, moderate variability, + accels, - decels  SVE: 3/50/-3  Skin: No rashes/lesions/erythema  Neuro: Awake, alert, conversational  CV: Regular rate, warm and well perfused  Respiratory: Even and unlabored on RA  Extremities: No edema, discoloration, or pain in BLE  Psych: appropriate mood and affect     Lab Review  Labs in chart were reviewed.

## 2024-05-24 NOTE — ANESTHESIA PROCEDURE NOTES
Epidural Block    Patient location during procedure: OB  Start time: 5/24/2024 6:07 PM  End time: 5/24/2024 6:29 PM  Reason for block: labor analgesia  Staffing  Performed: resident   Authorized by: Johny Lombardo MD    Performed by: Johny Lombardo MD    Preanesthetic Checklist  Completed: patient identified, IV checked, risks and benefits discussed, surgical consent, pre-op evaluation, timeout performed and sterile techniques followed  Block Timeout  RN/Licensed healthcare professional reads aloud to the Anesthesia provider and entire team: Patient identity, procedure with side and site, patient position, and as applicable the availability of implants/special equipment/special requirements.  Patient on coagulant treatment: no  Timeout performed at: 5/24/2024 6:07 PM  Block Placement  Patient position: sitting  Prep: ChloraPrep  Sterility prep: cap, drape, gloves, hand and mask  Sedation level: no sedation  Patient monitoring: blood pressure, continuous pulse oximetry and heart rate  Approach: midline  Local numbing: lidocaine 1% to skin and subcutaneous tissues  Vertebral space: lumbar  Lumbar location: L3-L4  Epidural  Loss of resistance technique: saline  Guidance: landmark technique        Needle  Needle type: Tuohy   Needle gauge: 17  Needle length: 10.2 cm  Needle insertion depth: 6.5 cm  Catheter type: multi-orifice  Catheter at skin depth: 12 cm  Catheter securement method: clear occlusive dressing    Test dose: lidocaine 1.5% with epinephrine 1-to-200,000  Test dose: lidocaine 1.5% with epinephrine 1-to-200,000  Test dose result: no positive test dose    PCEA  Medication concentration used: 0.044% Bupivacaine with 1.25 mcg/mL Fentanyl and 1:[id number] Epinephrine  Dose (mL): 160  Lockout (minutes): 15  1-Hour Limit (boluses/hr): 4  Basal Rate: 14        Assessment  Sensory level: T8. bilateral  Block outcome: pain improved  Number of attempts: 1  Events: no positive test dose  Procedure  assessment: patient tolerated procedure well with no immediate complications

## 2024-05-24 NOTE — CARE PLAN
The clinical goals for the shift include healthy mom/ healthy baby    Patient admitted this shift for IOL. Epidural placed this shift, patient is currently stable.    Brittany Marques RN

## 2024-05-24 NOTE — PROGRESS NOTES
Intrapartum Progress Note    Assessment/Plan   Cee Morales is a 28 y.o.  at 39w1d. MARLON: 2024, by Ultrasound.     1. IOL  - Most recent SVE: 1  - Pit started at 1700  - Currently intact. Will await AROM until epidural kicks in  - Delivery plan: patient desires vaginal delivery, patient counseled on risks of labor, vaginal delivery, and possibility of C/S for varying maternal or fetal indications    2. Maternal well-being  - Hgb. 11.3   - Epidural currently infusing   - GBS pos- PCN    3. Fetal well-being  - CEFM; Cat 1 currently    Seen & dw. Dr. Arian Mancilla MD  PGY-1, Obstetrics & Gynecology   [facility]     Principal Problem:    Labor and delivery, indication for care ([facility])    Pregnancy Problems (from 23 to present)       Problem Noted Resolved    Labor and delivery, indication for care ([facility]) 2024 by Karolina Foster MD No    Priority:  Medium              Subjective   Patient sitting for epidural, very uncomfortable. Amenable to cervical exam.     Objective   Last Vitals:  Temp Pulse Resp BP MAP Pulse Ox   36.8 °C (98.2 °F) 81 18 111/68   100 %     Vitals Min/Max Last 24 Hours:  Temp  Min: 36.8 °C (98.2 °F)  Max: 36.8 °C (98.2 °F)  Pulse  Min: 45  Max: 107  Resp  Min: 16  Max: 18  BP  Min: 103/55  Max: 134/58    Intake/Output:  No intake or output data in the 24 hours ending 24 1832    Physical Examination:  Genitourinary:      Vulva normal.   Cardiovascular:      Rate and Rhythm: Normal rate.   Pulmonary:      Effort: Pulmonary effort is normal.      Breath sounds: Normal breath sounds.   Abdominal:      Palpations: Abdomen is soft.      Comments: Gravid, non-tender to palpation  Neurological:      General: No focal deficit present.      Mental Status: She is alert.   Skin:     General: Skin is warm and dry.   Psychiatric:         Mood and Affect: Mood normal.         Behavior: Behavior normal.         NST  Baseline:  130  Variability: mod  Accels: +  Decels: -     SVE: 6/90/-1      Lab Review:  Labs in chart were reviewed.

## 2024-05-25 PROCEDURE — 2500000001 HC RX 250 WO HCPCS SELF ADMINISTERED DRUGS (ALT 637 FOR MEDICARE OP)

## 2024-05-25 PROCEDURE — 1210000001 HC SEMI-PRIVATE ROOM DAILY

## 2024-05-25 PROCEDURE — 2500000005 HC RX 250 GENERAL PHARMACY W/O HCPCS

## 2024-05-25 RX ORDER — OXYTOCIN 10 [USP'U]/ML
10 INJECTION, SOLUTION INTRAMUSCULAR; INTRAVENOUS ONCE AS NEEDED
Status: DISCONTINUED | OUTPATIENT
Start: 2024-05-25 | End: 2024-05-26 | Stop reason: HOSPADM

## 2024-05-25 RX ORDER — OXYTOCIN/0.9 % SODIUM CHLORIDE 30/500 ML
60 PLASTIC BAG, INJECTION (ML) INTRAVENOUS ONCE AS NEEDED
Status: DISCONTINUED | OUTPATIENT
Start: 2024-05-25 | End: 2024-05-26 | Stop reason: HOSPADM

## 2024-05-25 RX ORDER — ONDANSETRON HYDROCHLORIDE 2 MG/ML
4 INJECTION, SOLUTION INTRAVENOUS EVERY 6 HOURS PRN
Status: DISCONTINUED | OUTPATIENT
Start: 2024-05-25 | End: 2024-05-26 | Stop reason: HOSPADM

## 2024-05-25 RX ORDER — TRANEXAMIC ACID 100 MG/ML
1000 INJECTION, SOLUTION INTRAVENOUS ONCE AS NEEDED
Status: DISCONTINUED | OUTPATIENT
Start: 2024-05-25 | End: 2024-05-26 | Stop reason: HOSPADM

## 2024-05-25 RX ORDER — MISOPROSTOL 200 UG/1
800 TABLET ORAL ONCE AS NEEDED
Status: DISCONTINUED | OUTPATIENT
Start: 2024-05-25 | End: 2024-05-26 | Stop reason: HOSPADM

## 2024-05-25 RX ORDER — LIDOCAINE 560 MG/1
1 PATCH PERCUTANEOUS; TOPICAL; TRANSDERMAL
Status: DISCONTINUED | OUTPATIENT
Start: 2024-05-25 | End: 2024-05-26 | Stop reason: HOSPADM

## 2024-05-25 RX ORDER — NIFEDIPINE 10 MG/1
10 CAPSULE ORAL ONCE AS NEEDED
Status: DISCONTINUED | OUTPATIENT
Start: 2024-05-25 | End: 2024-05-26 | Stop reason: HOSPADM

## 2024-05-25 RX ORDER — POLYETHYLENE GLYCOL 3350 17 G/17G
17 POWDER, FOR SOLUTION ORAL 2 TIMES DAILY PRN
Status: DISCONTINUED | OUTPATIENT
Start: 2024-05-25 | End: 2024-05-26 | Stop reason: HOSPADM

## 2024-05-25 RX ORDER — CARBOPROST TROMETHAMINE 250 UG/ML
250 INJECTION, SOLUTION INTRAMUSCULAR ONCE AS NEEDED
Status: DISCONTINUED | OUTPATIENT
Start: 2024-05-25 | End: 2024-05-26 | Stop reason: HOSPADM

## 2024-05-25 RX ORDER — LOPERAMIDE HYDROCHLORIDE 2 MG/1
4 CAPSULE ORAL EVERY 2 HOUR PRN
Status: DISCONTINUED | OUTPATIENT
Start: 2024-05-25 | End: 2024-05-26 | Stop reason: HOSPADM

## 2024-05-25 RX ORDER — DIPHENHYDRAMINE HYDROCHLORIDE 50 MG/ML
25 INJECTION INTRAMUSCULAR; INTRAVENOUS EVERY 6 HOURS PRN
Status: DISCONTINUED | OUTPATIENT
Start: 2024-05-25 | End: 2024-05-26 | Stop reason: HOSPADM

## 2024-05-25 RX ORDER — HYDRALAZINE HYDROCHLORIDE 20 MG/ML
5 INJECTION INTRAMUSCULAR; INTRAVENOUS ONCE AS NEEDED
Status: DISCONTINUED | OUTPATIENT
Start: 2024-05-25 | End: 2024-05-26 | Stop reason: HOSPADM

## 2024-05-25 RX ORDER — ADHESIVE BANDAGE
10 BANDAGE TOPICAL
Status: DISCONTINUED | OUTPATIENT
Start: 2024-05-25 | End: 2024-05-26 | Stop reason: HOSPADM

## 2024-05-25 RX ORDER — BISACODYL 10 MG/1
10 SUPPOSITORY RECTAL DAILY PRN
Status: DISCONTINUED | OUTPATIENT
Start: 2024-05-25 | End: 2024-05-26 | Stop reason: HOSPADM

## 2024-05-25 RX ORDER — DIPHENHYDRAMINE HCL 25 MG
25 CAPSULE ORAL EVERY 6 HOURS PRN
Status: DISCONTINUED | OUTPATIENT
Start: 2024-05-25 | End: 2024-05-26 | Stop reason: HOSPADM

## 2024-05-25 RX ORDER — LABETALOL HYDROCHLORIDE 5 MG/ML
20 INJECTION, SOLUTION INTRAVENOUS ONCE AS NEEDED
Status: DISCONTINUED | OUTPATIENT
Start: 2024-05-25 | End: 2024-05-26 | Stop reason: HOSPADM

## 2024-05-25 RX ORDER — METHYLERGONOVINE MALEATE 0.2 MG/ML
0.2 INJECTION INTRAVENOUS ONCE AS NEEDED
Status: DISCONTINUED | OUTPATIENT
Start: 2024-05-25 | End: 2024-05-26 | Stop reason: HOSPADM

## 2024-05-25 RX ORDER — LIDOCAINE HYDROCHLORIDE 10 MG/ML
3 INJECTION, SOLUTION EPIDURAL; INFILTRATION; INTRACAUDAL; PERINEURAL ONCE AS NEEDED
Status: DISCONTINUED | OUTPATIENT
Start: 2024-05-25 | End: 2024-05-26 | Stop reason: HOSPADM

## 2024-05-25 RX ORDER — SIMETHICONE 80 MG
80 TABLET,CHEWABLE ORAL 4 TIMES DAILY PRN
Status: DISCONTINUED | OUTPATIENT
Start: 2024-05-25 | End: 2024-05-26 | Stop reason: HOSPADM

## 2024-05-25 RX ORDER — ONDANSETRON 4 MG/1
4 TABLET, FILM COATED ORAL EVERY 6 HOURS PRN
Status: DISCONTINUED | OUTPATIENT
Start: 2024-05-25 | End: 2024-05-26 | Stop reason: HOSPADM

## 2024-05-25 RX ADMIN — ACETAMINOPHEN 975 MG: 325 TABLET ORAL at 03:53

## 2024-05-25 RX ADMIN — IBUPROFEN 600 MG: 600 TABLET, FILM COATED ORAL at 16:23

## 2024-05-25 RX ADMIN — Medication 1 EACH: at 10:26

## 2024-05-25 RX ADMIN — IBUPROFEN 600 MG: 600 TABLET, FILM COATED ORAL at 10:23

## 2024-05-25 RX ADMIN — ACETAMINOPHEN 975 MG: 325 TABLET ORAL at 16:23

## 2024-05-25 RX ADMIN — ACETAMINOPHEN 975 MG: 325 TABLET ORAL at 10:23

## 2024-05-25 RX ADMIN — BENZOCAINE AND LEVOMENTHOL 1 APPLICATION: 200; 5 SPRAY TOPICAL at 10:26

## 2024-05-25 RX ADMIN — ACETAMINOPHEN 975 MG: 325 TABLET ORAL at 22:09

## 2024-05-25 RX ADMIN — IBUPROFEN 600 MG: 600 TABLET, FILM COATED ORAL at 03:53

## 2024-05-25 RX ADMIN — IBUPROFEN 600 MG: 600 TABLET, FILM COATED ORAL at 22:09

## 2024-05-25 ASSESSMENT — PAIN SCALES - GENERAL
PAINLEVEL_OUTOF10: 2
PAIN_LEVEL: 3
PAINLEVEL_OUTOF10: 3
PAINLEVEL_OUTOF10: 3

## 2024-05-25 ASSESSMENT — PAIN DESCRIPTION - LOCATION: LOCATION: PERINEUM

## 2024-05-25 NOTE — PROGRESS NOTES
Postpartum Progress Note      Assessment/Plan     Cee Morales is a 28 y.o.,  initially presented for rrIOL.  She had a Vaginal, Spontaneous   delivery on 2024  at 39w1d and is now postpartum day 1.    #Postpartum  - continue routine postpartum care  - pain well controlled on po medications  - DVT Score: 5 ; SCDs and enoxaparin prophylactic dose daily while inpatient  - The patient's blood type is O POS. The baby's blood type is O POS . Rhogam is not indicated.    #Maternal Well-Being  - emotional support provided  - bonding with infant @ bedside  - Contraception: Nexplanon    # Feeding  Patient is strictly formula feeding. We reviewed non-pharmacologic methods of lactation suppression and s/sx of mastitis.   #Dispo  - anticipate d/c on PPD #2 if continuing to meet all postpartum milestones  - for f/u 4-6 weeks with Primary OB provider      Principal Problem:    Labor and delivery, indication for care ([facility])    Subjective   Pt unsure about contraception- considering Nexplanon but will defer to f/up visit.  Meeting all postpartum milestones- ambulating independently, passing flatus, tolerating PO intake, lochia light, voiding spontaneously, and pain well controlled with PO meds.    Objective   Physical Exam:  General: well appearing, well nourished, postpartum  Obstetric: fundus firm below umbilicus, lochia light  Skin: Warm, dry; no rashes/lesions/erythema  Breast: No masses, nipple discharge  Neuro: A/Ox3, no gross motor deficit   GI: no distension, appropriately tender, soft, +BS  Respiratory: Even and unlabored on RA, LSCTA BL  Cardiovascular: No BLE edema; No erythema, warmth  Psych: appropriate mood and affect, conversational    Last Vitals:  Temp Pulse Resp BP MAP Pulse Ox   36.4 °C (97.5 °F) 83 17 103/66   96 %       Vitals Min/Max Last 24 Hours:  Temp  Min: 36.1 °C (97 °F)  Max: 36.8 °C (98.2 °F)  Pulse  Min: 45  Max: 107  Resp  Min: 16  Max: 18  BP  Min: 95/55  Max: 134/58    Lab  Data:  Lab Results   Component Value Date    WBC 11.7 (H) 05/24/2024    HGB 11.3 (L) 05/24/2024    HCT 35.6 (L) 05/24/2024     05/24/2024

## 2024-05-25 NOTE — L&D DELIVERY NOTE
OB Delivery Note  2024  Cee Carmen  28 y.o.   Vaginal, Spontaneous        Gestational Age: 39w1d  /Para:   Quantitative Blood Loss: Admission to Discharge: 0 mL (2024  2:01 PM - 2024 10:28 PM)    Carmen Tani [[id number]]      Labor Events    Labor type: Induced Onset of Labor  Labor allowed to proceed with plans for an attempted vaginal birth?: Yes  Induction: Oxytocin  First cervical ripening date/time: 2024  Induction date/time: 2024  Induction indications: Risk Reducing  Complications: None       Labor Event Times    Labor onset date/time: 2024  Dilation complete date/time: 2024  Start pushing date/time: 2024       Labor Length    1st stage: 5h 42m  2nd stage: 0h 10m  3rd stage: 0h 07m       Placenta    Placenta delivery date/time: 2024  Placenta removal: Spontaneous  Placenta appearance: Intact  Placenta disposition: discarded       Cord    Vessels: 3 vessels  Complications: None  Delayed cord clamping?: Yes  Cord clamped date/time: 2024  Cord blood disposition: Lab  Gases sent?: No  Stem cell collection (by provider): No       Lacerations    Episiotomy: None  Perineal laceration: 2nd  Perineal laceration repaired?: Yes  Other lacerations?: No  Repair suture: Synthetic Suture       Anesthesia    Method: Epidural       Operative Delivery    Forceps attempted?: No  Vacuum extractor attempted?: No       Shoulder Dystocia    Shoulder dystocia present?: No       [place] Delivery    Birth date/time: 2024 19:53:00  Delivery type: Vaginal, Spontaneous  Complications: None       Resuscitation    Method: Suctioning, Tactile stimulation       Apgars    Living status: Living  Apgar Component Scores:  1 min.:  5 min.:  10 min.:  15 min.:  20 min.:    Skin color:  1  1       Heart rate:  2  2       Reflex irritability:  2  2       Muscle tone:  1  2       Respiratory effort:  2  2       Total:  8  9        Apgars assigned by: LU KRAFT       Delivery Providers    Delivering clinician: Fausto Don MD   Provider Role    Ginger Lauren, RN Delivery Nurse    Lori Villarreal, RN Nursery Nurse    Harriet Mancilla MD Resident    Lu Kraft, RN Nursery Nurse                 Uncomplicated . Second degree laceration - repaired with good hemostasis.    Dr. Don was present for the entirety of the delivery and repair.     JUVENTINO Mancilla MD  PGY-1, Obstetrics & Gynecology   [facility]

## 2024-05-25 NOTE — ANESTHESIA POSTPROCEDURE EVALUATION
Patient: Cee Morales    Procedure Summary       Date: 05/24/24 Room / Location:     Anesthesia Start: 1807 Anesthesia Stop: 1953    Procedure: Labor Analgesia Diagnosis:     Scheduled Providers:  Responsible Provider: Janie Torrez MD    Anesthesia Type: epidural ASA Status: 2            Anesthesia Type: epidural    Vitals Value Taken Time   /63 05/25/24 0719   Temp 97.5 05/25/24 0719   Pulse 74 05/25/24 0719        SpO2 96 05/25/24 0719       Anesthesia Post Evaluation    Patient location during evaluation: bedside  Patient participation: complete - patient participated  Level of consciousness: awake  Pain score: 3  Pain management: adequate  Airway patency: patent  Cardiovascular status: acceptable  Respiratory status: acceptable  Hydration status: acceptable  Postoperative Nausea and Vomiting: none      No notable events documented.

## 2024-05-26 VITALS
OXYGEN SATURATION: 96 % | RESPIRATION RATE: 18 BRPM | DIASTOLIC BLOOD PRESSURE: 70 MMHG | HEART RATE: 70 BPM | HEIGHT: 61 IN | SYSTOLIC BLOOD PRESSURE: 109 MMHG | WEIGHT: 160.5 LBS | BODY MASS INDEX: 30.3 KG/M2 | TEMPERATURE: 97.7 F

## 2024-05-26 PROCEDURE — 2500000001 HC RX 250 WO HCPCS SELF ADMINISTERED DRUGS (ALT 637 FOR MEDICARE OP)

## 2024-05-26 RX ORDER — IBUPROFEN 600 MG/1
600 TABLET ORAL EVERY 6 HOURS PRN
Qty: 120 TABLET | Refills: 0 | Status: SHIPPED | OUTPATIENT
Start: 2024-05-26 | End: 2024-06-25

## 2024-05-26 RX ORDER — ACETAMINOPHEN 500 MG
1000 TABLET ORAL EVERY 6 HOURS PRN
Qty: 120 TABLET | Refills: 0 | Status: SHIPPED | OUTPATIENT
Start: 2024-05-26

## 2024-05-26 RX ADMIN — IBUPROFEN 600 MG: 600 TABLET, FILM COATED ORAL at 04:03

## 2024-05-26 RX ADMIN — ACETAMINOPHEN 975 MG: 325 TABLET ORAL at 04:03

## 2024-05-26 ASSESSMENT — PAIN SCALES - GENERAL: PAINLEVEL_OUTOF10: 2

## 2024-05-26 NOTE — DISCHARGE SUMMARY
Discharge Summary    Admission Date: 2024  Discharge Date: 24  Discharge Diagnosis: Labor and delivery, indication for care ([facility])     Patient Active Problem List   Diagnosis    [place]    Anemia of pregnancy ([facility])    Labor and delivery, indication for care ([facility])       Hospital Course  Cee Morales is a 28 y.o.,     Initially presented for: rrIOL    Admission Date: 2024    Delivery Date: 2024  7:53 PM     Delivery type: Vaginal, Spontaneous      GA at delivery: 39w1d    Outcome: Living     Anesthesia during delivery: Epidural     Intrapartum complications: None     Feeding method: Breastfeeding Status: No    Contraception: Defers contraception to primary OB/PP visit. We discussed pregnancy spacing of at least one year, abstaining from intercourse for 6wks, and the ability to become pregnant in the absence of regular menses. Pt verbalized understanding.     Rhogam: The patient's blood type is O POS. The baby's blood type is O POS . Rhogam is not indicated.     Now postpartum day: 2.    Hospital course n/f:  PP course uneventful.  Meeting all postpartum milestones- ambulating independently, passing flatus, tolerating PO intake, lochia light, voiding spontaneously, and pain well controlled with PO meds.     Dispo  OK for DC today  6 week postpartum follow-up with prenatal provider.     Pertinent Physical Exam At Time of Discharge  General: well appearing, well nourished, postpartum  Obstetric: fundus firm below umbilicus, lochia light  Skin: Warm, dry; no rashes/lesions/erythema  Breast: No masses, nipple discharge  Neuro: A/Ox3, conversational, no gross motor deficit   GI: no distension, appropriately tender, soft, +BS  Respiratory: Even and unlabored on RA, LSCTA BL  Cardiovascular: No BLE edema; No erythema, warmth  Psych: appropriate mood and affect       Your medication list        START taking these medications        Instructions Last Dose Given Next Dose Due    acetaminophen 500 mg tablet  Commonly known as: Tylenol      Take 2 tablets (1,000 mg) by mouth every 6 hours if needed for moderate pain (4 - 6).       ibuprofen 600 mg tablet      Take 1 tablet (600 mg) by mouth every 6 hours if needed for moderate pain (4 - 6) (pain).              CONTINUE taking these medications        Instructions Last Dose Given Next Dose Due   prenatal vitamin (iron-folic) 27 mg iron-800 mcg folic acid tablet      Take 1 tablet by mouth once daily.                 Where to Get Your Medications        These medications were sent to [facility]/pharmacy #[id number] - [place], [place] - [address]  [address] [address]      Hours: 24-hours Phone: [phone]   acetaminophen 500 mg tablet  ibuprofen 600 mg tablet           Outpatient Follow-Up  Future Appointments   Date Time Provider Department Center   5/28/2024  4:00 PM [id number] [place]   6/18/2024 10:15 AM MD [id number]       ENEDINA Horvath-CNP

## 2024-05-26 NOTE — CARE PLAN
The patient's goals for the shift include      The clinical goals for the shift include healthy mom and baby    Problem: Postpartum  Goal: Experiences normal postpartum course  Outcome: Adequate for Discharge

## 2024-05-28 ENCOUNTER — APPOINTMENT (OUTPATIENT)
Dept: OBSTETRICS AND GYNECOLOGY | Facility: CLINIC | Age: 29
End: 2024-05-28
Payer: COMMERCIAL

## 2024-06-18 ENCOUNTER — APPOINTMENT (OUTPATIENT)
Dept: OBSTETRICS AND GYNECOLOGY | Facility: CLINIC | Age: 29
End: 2024-06-18
Payer: COMMERCIAL

## 2024-06-18 VITALS — DIASTOLIC BLOOD PRESSURE: 60 MMHG | BODY MASS INDEX: 26.45 KG/M2 | WEIGHT: 140 LBS | SYSTOLIC BLOOD PRESSURE: 110 MMHG

## 2024-06-18 ASSESSMENT — EDINBURGH POSTNATAL DEPRESSION SCALE (EPDS)
I HAVE FELT SAD OR MISERABLE: NO, NOT AT ALL
I HAVE BEEN ANXIOUS OR WORRIED FOR NO GOOD REASON: NO, NOT AT ALL
I HAVE BEEN SO UNHAPPY THAT I HAVE HAD DIFFICULTY SLEEPING: NOT AT ALL
TOTAL SCORE: 1
I HAVE LOOKED FORWARD WITH ENJOYMENT TO THINGS: AS MUCH AS I EVER DID
I HAVE BEEN SO UNHAPPY THAT I HAVE BEEN CRYING: NO, NEVER
I HAVE BLAMED MYSELF UNNECESSARILY WHEN THINGS WENT WRONG: NO, NEVER
I HAVE FELT SCARED OR PANICKY FOR NO GOOD REASON: NO, NOT AT ALL
THINGS HAVE BEEN GETTING ON TOP OF ME: NO, MOST OF THE TIME I HAVE COPED QUITE WELL
I HAVE BEEN ABLE TO LAUGH AND SEE THE FUNNY SIDE OF THINGS: AS MUCH AS I ALWAYS COULD
THE THOUGHT OF HARMING MYSELF HAS OCCURRED TO ME: NEVER

## 2024-06-18 ASSESSMENT — ENCOUNTER SYMPTOMS
NEUROLOGICAL NEGATIVE: 0
EYES NEGATIVE: 0
CARDIOVASCULAR NEGATIVE: 0
ENDOCRINE NEGATIVE: 0
HEMATOLOGIC/LYMPHATIC NEGATIVE: 0
CONSTITUTIONAL NEGATIVE: 0
RESPIRATORY NEGATIVE: 0
MUSCULOSKELETAL NEGATIVE: 0
PSYCHIATRIC NEGATIVE: 0
ALLERGIC/IMMUNOLOGIC NEGATIVE: 0
GASTROINTESTINAL NEGATIVE: 0

## 2024-06-18 ASSESSMENT — PAIN SCALES - GENERAL: PAINLEVEL: 0-NO PAIN

## 2024-06-18 NOTE — PROGRESS NOTES
28-year-old -0-0-2  woman presents ~ 3 weeks s/p  in a pregnancy complicated by tobacco use and decreased interval fetal growth.    She continues to smoke (since age 15) and vapes (1 year).     The patient report suture irritation from her vaginal laceration.     She is requesting an additional 2 weeks of maternity leave.     She is considering contraceptive options.     Subjective   Patient ID: Cee Morales is a 28 y.o. female who presents for Postpartum Follow-up (Pt here for PPV/DOD 24 vaginal delivery at 39-1 had vaginal tear /Pt is not breastfeeding /Birth control nexplanon/Last  pap 2023 WNL).  HPI    Review of Systems    Objective   Physical Exam  Exam conducted with a chaperone present.   Constitutional:       Appearance: She is normal weight.   HENT:      Head: Normocephalic.      Right Ear: External ear normal.      Left Ear: External ear normal.      Nose: Nose normal.      Mouth/Throat:      Mouth: Mucous membranes are moist.   Eyes:      Extraocular Movements: Extraocular movements intact.      Pupils: Pupils are equal, round, and reactive to light.   Abdominal:      Palpations: Abdomen is soft.   Genitourinary:     General: Normal vulva.      Labia:         Right: No rash or lesion.         Left: No rash or lesion.       Vagina: Normal.      Cervix: Normal.      Uterus: Normal.       Adnexa: Right adnexa normal and left adnexa normal.   Musculoskeletal:         General: Normal range of motion.   Skin:     General: Skin is warm and dry.   Neurological:      General: No focal deficit present.      Mental Status: She is alert and oriented to person, place, and time.   Psychiatric:         Mood and Affect: Mood normal.         Behavior: Behavior normal.         A/P: PPV     -  PP info packet     -  RTC 3-4 months for APE

## 2024-06-18 NOTE — PATIENT INSTRUCTIONS
Congratulations on the birth of your baby!    Review the postpartum information provided today.    Return the office in 3 to 4 months for your annual GYN exam.    Follow-up with your PCP and other healthcare specialist as needed.    Feel free to call the office with any problems, questions or concerns prior to your next scheduled visit.

## 2025-04-17 ENCOUNTER — OFFICE VISIT (OUTPATIENT)
Dept: URGENT CARE | Age: 30
End: 2025-04-17
Payer: COMMERCIAL

## 2025-04-17 VITALS
WEIGHT: 143 LBS | DIASTOLIC BLOOD PRESSURE: 82 MMHG | SYSTOLIC BLOOD PRESSURE: 127 MMHG | BODY MASS INDEX: 27 KG/M2 | OXYGEN SATURATION: 100 % | RESPIRATION RATE: 22 BRPM | TEMPERATURE: 98.2 F | HEIGHT: 61 IN | HEART RATE: 76 BPM

## 2025-04-17 DIAGNOSIS — R07.9 CHEST PAIN, UNSPECIFIED TYPE: Primary | ICD-10-CM

## 2025-04-17 PROCEDURE — 3008F BODY MASS INDEX DOCD: CPT | Performed by: SURGERY

## 2025-04-17 PROCEDURE — 99203 OFFICE O/P NEW LOW 30 MIN: CPT | Performed by: SURGERY

## 2025-04-17 PROCEDURE — 93000 ELECTROCARDIOGRAM COMPLETE: CPT | Performed by: SURGERY

## 2025-04-17 NOTE — PROGRESS NOTES
Chief Complaint   Patient presents with    Chest Pain     Chest pain started earlier this morning.       Physical Exam:   GEN: Awake and alert, No acute distress     Resp: lungs clear to auscultation bilaterally     CV: regular rate and rhythm        Encounter Date: 04/17/25   ECG 12 Lead    Narrative    NSR             Encounter Diagnosis   Name Primary?    Chest pain, unspecified type Yes        Medical Decision Making & Plan:   Substernal chest pain going down left arm. EKG unremarkable, however given symptoms, patient is transferred to [facility] ED for definitiv evaluation    ED        04/17/25 at 1:48 PM - Windy Goodwin DO